# Patient Record
Sex: MALE | Race: WHITE | NOT HISPANIC OR LATINO | Employment: OTHER | URBAN - METROPOLITAN AREA
[De-identification: names, ages, dates, MRNs, and addresses within clinical notes are randomized per-mention and may not be internally consistent; named-entity substitution may affect disease eponyms.]

---

## 2017-01-17 ENCOUNTER — DOCUMENTATION ONLY (OUTPATIENT)
Dept: PSYCHIATRY | Facility: CLINIC | Age: 37
End: 2017-01-17

## 2017-01-17 ENCOUNTER — DOCUMENTATION ONLY (OUTPATIENT)
Dept: PSYCHIATRY | Facility: HOSPITAL | Age: 37
End: 2017-01-17

## 2017-02-24 ENCOUNTER — OFFICE VISIT (OUTPATIENT)
Dept: PSYCHIATRY | Facility: CLINIC | Age: 37
End: 2017-02-24
Payer: MEDICARE

## 2017-02-24 VITALS
DIASTOLIC BLOOD PRESSURE: 64 MMHG | HEIGHT: 65 IN | SYSTOLIC BLOOD PRESSURE: 110 MMHG | BODY MASS INDEX: 21.43 KG/M2 | WEIGHT: 128.63 LBS | HEART RATE: 64 BPM

## 2017-02-24 DIAGNOSIS — F40.10 SOCIAL ANXIETY DISORDER: ICD-10-CM

## 2017-02-24 DIAGNOSIS — R45.4 EXCESSIVE ANGER: ICD-10-CM

## 2017-02-24 DIAGNOSIS — F41.1 GAD (GENERALIZED ANXIETY DISORDER): Primary | ICD-10-CM

## 2017-02-24 PROCEDURE — 99212 OFFICE O/P EST SF 10 MIN: CPT | Mod: S$PBB,,, | Performed by: PSYCHIATRY & NEUROLOGY

## 2017-02-24 PROCEDURE — 99999 PR PBB SHADOW E&M-EST. PATIENT-LVL III: CPT | Mod: PBBFAC,,, | Performed by: PSYCHIATRY & NEUROLOGY

## 2017-02-24 PROCEDURE — 99213 OFFICE O/P EST LOW 20 MIN: CPT | Mod: PBBFAC | Performed by: PSYCHIATRY & NEUROLOGY

## 2017-02-24 RX ORDER — CLONAZEPAM 1 MG/1
1 TABLET ORAL 2 TIMES DAILY PRN
Qty: 60 TABLET | Refills: 2 | Status: SHIPPED | OUTPATIENT
Start: 2017-02-24 | End: 2017-05-08 | Stop reason: SDUPTHER

## 2017-02-24 NOTE — PROGRESS NOTES
"2/24/2017 11:35 AM   Dmitriy Barlow   1980   [id number]           OUTPATIENT PSYCHIATRY FOLLOW- UP VISIT    Reason for Encounter:  Dmitriy Barlow, a 36 y.o. male,who presents today for follow up of anxiety and behavior problems. . Met with patient.    Interval History and Content of Current Session:  Today,  Pt reports that he is doing well when he was on the klonopin. He states that he was not able to come in before he ran out and as a result he is anxious. He notes that he felt the previous dose was helping but states that someday he finds it wasn't fully working. He denied any new complains or any worsening of symptoms, including any anger outbursts, emotional episodes or panic attacks.  He also denied any SI/HI/AVH     Substance use  None reported    Review of Systems       Past Medical, Family and Social History: The patient's past medical, family and social history have been reviewed and updated as appropriate within the electronic medical record - see encounter notes.    Compliance: yes    Side effects: None    Risk Parameters:  Patient reports no suicidal ideation  Patient reports no homicidal ideation  Patient reports no self-injurious behavior  Patient reports no violent behavior    Objective     Constitutional  Vitals:  Most recent vital signs, dated less than 90 days prior to this appointment, were reviewed.    Vitals:    02/24/17 1125   BP: 110/64   Pulse: 64   Weight: 58.3 kg (128 lb 9.6 oz)   Height: 5' 5" (1.651 m)        General:  unremarkable, age appropriate       Laboratory Data: reviewed      Medications:  Outpatient Encounter Prescriptions as of 2/24/2017   Medication Sig Dispense Refill    clonazePAM (KLONOPIN) 0.5 MG tablet Take 1 tablet (0.5 mg total) by mouth 2 (two) times daily as needed for Anxiety. 42 tablet 0    hydrocodone-acetaminophen 5-325mg (NORCO) 5-325 mg per tablet Take 1 tablet by mouth every 4 (four) hours as needed. 12 tablet 0    hydrocortisone 2.5 % cream Apply " topically 4 (four) times daily. Use as needed until the rash is gone 20 g 1    ibuprofen (ADVIL,MOTRIN) 800 MG tablet Take 1 tablet (800 mg total) by mouth every 6 (six) hours as needed for Pain. 20 tablet 0     No facility-administered encounter medications on file as of 2/24/2017.        Allergy:  Review of patient's allergies indicates:   Allergen Reactions    Poison ivy [vit e-nonoxynol 9-aloe vera]     Poison oak extract     Poison sumac extract        Nutritional Screening: Considering the patient's height and weight, medications, medical history and preferences, should a referral be made to the dietitian? no      Musculoskeletal  Muscle Strength/Tone:  no spasicity, no rigidity   Gait & Station:  non-ataxic   AIMS*      Mental Status Exam:  Appearance: unremarkable, age appropriate  Behavior/Cooperation: appopriate friendly and cooperative  Speech: appropriate and  normal tone, normal rate, normal pitch, normal volume  Language: grossly intact, able to name with spontaneous speech  Mood: anxious  Affect:  congruent with mood and appropriate to situation/content Grossly intact  Thought Process: normal and logical  Thought Content: normal, no suicidality, no homicidality, delusions, or paranoia  Sensorium:  Awake  Alert and Oriented: x3 grossly intact  Memory: appropriate to context of interview   Recent: Intact; able to report recent events   Remote: Intact  Attention/concentration: appropriate for age/education.  Insight: Intact  Judgment: Intact    Assessment and Diagnosis   Status/Progress: Based on the examination today, the patient's problem(s) is/are adequately but not ideally controlled.  New problems have not been presented today.   Co-morbidities, Diagnostic uncertainty and Lack of compliance are not complicating management of the primary condition.  There are no active rule-out diagnoses for this patient at this time.     General Impression:       ICD-10-CM ICD-9-CM   1. DAYA (generalized anxiety  disorder) F41.1 300.02   2. Social anxiety disorder F40.10 300.23   3. Excessive anger R45.4 312.00       Intervention/Counseling/Treatment Plan   · Medication Management: -   · Increase Klonopin 1mg BID for anxiety and insomnia  · Informed pt of the risks of continuous Benzodiazepine use including tolerance, dependence and withdrawals that may be life threatening upon abrupt cessation. Also advised not to take Benzodiazepines with Opiates or other sedatives and also not to drive or operate heavy machinery while using Benzodiazepines.  · Labs: reviewed   · The treatment plan and follow up plan were reviewed with the patient.  · Discussed with patient informed consent, risks vs. benefits, alternative treatments, side effect profile and the inherent unpredictability of individual responses to these treatments. The patient expresses understanding of the above and displays the capacity to agree with this current plan and had no other questions.  · Encouraged Patient to keep future appointments.   · Take medications as prescribed and abstain from substance abuse.   · In the event of an emergency patient was advised to go to the emergency room.    Return to Clinic: 3 months    Coordination of care /Counseling time: > than 50% of total time was spend on this  Add on Psychotherapy time: 0  Total Face time:30m    ANA LUISA YANCEY MD   [facility]   2/24/2017 11:35 AM

## 2017-02-24 NOTE — MR AVS SNAPSHOT
[facility]  [address]  [place] [address]  Phone: [phone]  Fax: [phone]                  Dmitriy Barlow   2017 11:30 AM   Office Visit    Description:  Male : 1980   Provider:  Carmen Perry MD   Department:  [facility]           Diagnoses this Visit        Comments    DAYA (generalized anxiety disorder)    -  Primary     Social anxiety disorder         Excessive anger                To Do List           Goals (5 Years of Data)     None      Follow-Up and Disposition     Return in about 3 months (around 2017).       These Medications        Disp Refills Start End    clonazePAM (KLONOPIN) 1 MG tablet 60 tablet 2 2017 3/26/2017    Take 1 tablet (1 mg total) by mouth 2 (two) times daily as needed for Anxiety. - Oral    Pharmacy: [facility]/pharmacy #[id number] - [place] - [address]  #: [phone]         [facility]     [facility] On Call Nurse Care Line -  Assistance  Registered nurses in the [facility] On Call Center provide clinical advisement, health education, appointment booking, and other advisory services.  Call for this free service at [phone].             Medications           Message regarding Medications     Verify the changes and/or additions to your medication regime listed below are the same as discussed with your clinician today.  If any of these changes or additions are incorrect, please notify your healthcare provider.        CHANGE how you are taking these medications     Start Taking Instead of    clonazePAM (KLONOPIN) 1 MG tablet clonazePAM (KLONOPIN) 0.5 MG tablet    Dosage:  Take 1 tablet (1 mg total) by mouth 2 (two) times daily as needed for Anxiety. Dosage:  Take 1 tablet (0.5 mg total) by mouth 2 (two) times daily as needed for Anxiety.    Reason for Change:  Reorder       STOP taking these medications     hydrocodone-acetaminophen 5-325mg (NORCO) 5-325 mg per tablet Take 1 tablet by mouth every 4  (four) hours as needed.    ibuprofen (ADVIL,MOTRIN) 800 MG tablet Take 1 tablet (800 mg total) by mouth every 6 (six) hours as needed for Pain.    hydrocortisone 2.5 % cream Apply topically 4 (four) times daily. Use as needed until the rash is gone           Verify that the below list of medications is an accurate representation of the medications you are currently taking.  If none reported, the list may be blank. If incorrect, please contact your healthcare provider. Carry this list with you in case of emergency.           Current Medications     clonazePAM (KLONOPIN) 1 MG tablet Take 1 tablet (1 mg total) by mouth 2 (two) times daily as needed for Anxiety.           Clinical Reference Information           Your Vitals Were     BP                   110/64           Blood Pressure          Most Recent Value    BP  110/64      Allergies as of 2/24/2017     Poison Ivy [Vit E-nonoxynol 9-aloe Vera]    Poison Oak Extract    Poison Sumac Extract      Immunizations Administered on Date of Encounter - 2/24/2017     None      [facility] Sign-Up     Activating your [facility] account is as easy as 1-2-3!     1) Visit [email or web address], select Sign Up Now, enter this activation code and your date of birth, then select Next.  [email or web address]  Expires: 4/10/2017 11:51 AM      2) Create a username and password to use when you visit [facility] in the future and select a security question in case you lose your password and select Next.    3) Enter your e-mail address and click Sign Up!    Additional Information  If you have questions, please e-mail [email or web address] or call [phone] to talk to our [facility] staff. Remember, [facility] is NOT to be used for urgent needs. For medical emergencies, dial 911.         Smoking Cessation     If you would like to quit smoking:   You may be eligible for free services if you are a [place] resident and started smoking cigarettes before September 1, 1988.  Call the Smoking Cessation  Trust ([facility]) toll free at (835) 643-7936 or (122) 322-8821.   Call 1-800-QUIT-NOW if you do not meet the above criteria.            Language Assistance Services     ATTENTION: Language assistance services are available, free of charge. Please call [phone].      ATENCIÓN: Si habla español, tiene a lopez disposición servicios gratuitos de asistencia lingüística. Llame al [phone].     CHÚ Ý: N?u b?n nói Ti?ng Vi?t, có các d?ch v? h? tr? ngôn ng? mi?n phí dành cho b?n. G?i s? [phone].         [facility] complies with applicable Federal civil rights laws and does not discriminate on the basis of race, color, national origin, age, disability, or sex.

## 2017-02-26 ENCOUNTER — HOSPITAL ENCOUNTER (EMERGENCY)
Facility: HOSPITAL | Age: 37
Discharge: HOME | End: 2017-02-27
Attending: EMERGENCY MEDICINE
Payer: MEDICARE

## 2017-02-26 DIAGNOSIS — G89.11 ACUTE TRAUMATIC PAIN: ICD-10-CM

## 2017-02-26 DIAGNOSIS — S39.012A LUMBAR STRAIN, INITIAL ENCOUNTER: Primary | ICD-10-CM
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
## 2017-02-26 PROCEDURE — 99283 EMERGENCY DEPT VISIT LOW MDM: CPT

## 2017-02-26 PROCEDURE — 25000003 PHARM REV CODE 250: Performed by: EMERGENCY MEDICINE

## 2017-02-26 RX ORDER — ACETAMINOPHEN 500 MG
1000 TABLET ORAL
Status: COMPLETED | OUTPATIENT
Start: 2017-02-26 | End: 2017-02-26

## 2017-02-26 RX ORDER — KETOROLAC TROMETHAMINE 30 MG/ML
30 INJECTION, SOLUTION INTRAMUSCULAR; INTRAVENOUS
Status: DISCONTINUED | OUTPATIENT
Start: 2017-02-26 | End: 2017-02-26

## 2017-02-26 RX ADMIN — ACETAMINOPHEN 1000 MG: 500 TABLET ORAL at 11:02
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
## 2017-02-27 VITALS
TEMPERATURE: 99 F | OXYGEN SATURATION: 100 % | SYSTOLIC BLOOD PRESSURE: 106 MMHG | HEART RATE: 67 BPM | DIASTOLIC BLOOD PRESSURE: 68 MMHG | RESPIRATION RATE: 18 BRPM | HEIGHT: 65 IN | BODY MASS INDEX: 21.33 KG/M2 | WEIGHT: 128 LBS

## 2017-02-27 RX ORDER — MELOXICAM 7.5 MG/1
7.5 TABLET ORAL 2 TIMES DAILY PRN
Qty: 20 TABLET | Refills: 0 | Status: SHIPPED | OUTPATIENT
Start: 2017-02-27 | End: 2017-09-23
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
## 2017-02-27 NOTE — ED NOTES
Charge nurse, Sarah at bedside to talk with pt. She updated him on his wife that she was being discharged and would be sent to [facility] upon her discharge. Intake was done and blanket was taken to pt.
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
## 2017-03-28 ENCOUNTER — HOSPITAL ENCOUNTER (EMERGENCY)
Facility: HOSPITAL | Age: 37
Discharge: PSYCHIATRIC OR SUBSTANCE USE FACILITY | End: 2017-03-29
Attending: EMERGENCY MEDICINE
Payer: MEDICARE

## 2017-03-28 DIAGNOSIS — R45.851 SUICIDAL IDEATION: Primary | ICD-10-CM

## 2017-03-28 LAB
ALBUMIN SERPL BCP-MCNC: 4.9 G/DL
ALP SERPL-CCNC: 80 U/L
ALT SERPL W/O P-5'-P-CCNC: 12 U/L
AMPHET+METHAMPHET UR QL: NEGATIVE
ANION GAP SERPL CALC-SCNC: 26 MMOL/L
APAP SERPL-MCNC: <3 UG/ML
AST SERPL-CCNC: 21 U/L
BACTERIA #/AREA URNS HPF: NORMAL /HPF
BARBITURATES UR QL SCN>200 NG/ML: NEGATIVE
BASOPHILS # BLD AUTO: 0.04 K/UL
BASOPHILS NFR BLD: 0.3 %
BENZODIAZ UR QL SCN>200 NG/ML: NEGATIVE
BILIRUB SERPL-MCNC: 1 MG/DL
BILIRUB UR QL STRIP: NEGATIVE
BUN SERPL-MCNC: 17 MG/DL
BZE UR QL SCN: NORMAL
CALCIUM SERPL-MCNC: 10.3 MG/DL
CANNABINOIDS UR QL SCN: NORMAL
CHLORIDE SERPL-SCNC: 105 MMOL/L
CLARITY UR: CLEAR
CO2 SERPL-SCNC: 13 MMOL/L
COLOR UR: YELLOW
CREAT SERPL-MCNC: 1.2 MG/DL
CREAT UR-MCNC: 204.3 MG/DL
DIFFERENTIAL METHOD: ABNORMAL
EOSINOPHIL # BLD AUTO: 0.3 K/UL
EOSINOPHIL NFR BLD: 2.1 %
ERYTHROCYTE [DISTWIDTH] IN BLOOD BY AUTOMATED COUNT: 12.6 %
EST. GFR  (AFRICAN AMERICAN): >60 ML/MIN/1.73 M^2
EST. GFR  (NON AFRICAN AMERICAN): >60 ML/MIN/1.73 M^2
ETHANOL SERPL-MCNC: <10 MG/DL
GLUCOSE SERPL-MCNC: 122 MG/DL
GLUCOSE UR QL STRIP: NEGATIVE
HCT VFR BLD AUTO: 48 %
HGB BLD-MCNC: 16.4 G/DL
HGB UR QL STRIP: NEGATIVE
KETONES UR QL STRIP: NEGATIVE
LEUKOCYTE ESTERASE UR QL STRIP: NEGATIVE
LYMPHOCYTES # BLD AUTO: 3.4 K/UL
LYMPHOCYTES NFR BLD: 27.5 %
MCH RBC QN AUTO: 31.1 PG
MCHC RBC AUTO-ENTMCNC: 34.2 %
MCV RBC AUTO: 91 FL
METHADONE UR QL SCN>300 NG/ML: NEGATIVE
MICROSCOPIC COMMENT: NORMAL
MONOCYTES # BLD AUTO: 1.3 K/UL
MONOCYTES NFR BLD: 10.4 %
NEUTROPHILS # BLD AUTO: 7.3 K/UL
NEUTROPHILS NFR BLD: 60.1 %
NITRITE UR QL STRIP: NEGATIVE
OPIATES UR QL SCN: NEGATIVE
PCP UR QL SCN>25 NG/ML: NEGATIVE
PH UR STRIP: 6 [PH] (ref 5–8)
PLATELET # BLD AUTO: 295 K/UL
PMV BLD AUTO: 9.3 FL
POTASSIUM SERPL-SCNC: 3.5 MMOL/L
PROT SERPL-MCNC: 8.2 G/DL
PROT UR QL STRIP: NEGATIVE
RBC # BLD AUTO: 5.28 M/UL
RBC #/AREA URNS HPF: 0 /HPF (ref 0–4)
SALICYLATES SERPL-MCNC: <5 MG/DL
SODIUM SERPL-SCNC: 144 MMOL/L
SP GR UR STRIP: 1.02 (ref 1–1.03)
TOXICOLOGY INFORMATION: NORMAL
TSH SERPL DL<=0.005 MIU/L-ACNC: 1.23 UIU/ML
URN SPEC COLLECT METH UR: NORMAL
UROBILINOGEN UR STRIP-ACNC: NEGATIVE EU/DL
WBC # BLD AUTO: 12.24 K/UL
WBC #/AREA URNS HPF: 3 /HPF (ref 0–5)

## 2017-03-28 PROCEDURE — 63600175 PHARM REV CODE 636 W HCPCS: Performed by: EMERGENCY MEDICINE

## 2017-03-28 PROCEDURE — 99285 EMERGENCY DEPT VISIT HI MDM: CPT | Mod: 25

## 2017-03-28 PROCEDURE — 80307 DRUG TEST PRSMV CHEM ANLYZR: CPT

## 2017-03-28 PROCEDURE — 84443 ASSAY THYROID STIM HORMONE: CPT

## 2017-03-28 PROCEDURE — 80053 COMPREHEN METABOLIC PANEL: CPT

## 2017-03-28 PROCEDURE — 81000 URINALYSIS NONAUTO W/SCOPE: CPT

## 2017-03-28 PROCEDURE — 80329 ANALGESICS NON-OPIOID 1 OR 2: CPT

## 2017-03-28 PROCEDURE — 80320 DRUG SCREEN QUANTALCOHOLS: CPT

## 2017-03-28 PROCEDURE — 85025 COMPLETE CBC W/AUTO DIFF WBC: CPT

## 2017-03-28 PROCEDURE — 96372 THER/PROPH/DIAG INJ SC/IM: CPT

## 2017-03-28 PROCEDURE — 82570 ASSAY OF URINE CREATININE: CPT

## 2017-03-28 RX ORDER — LORAZEPAM 2 MG/ML
2 INJECTION INTRAMUSCULAR
Status: COMPLETED | OUTPATIENT
Start: 2017-03-28 | End: 2017-03-28

## 2017-03-28 RX ORDER — DIPHENHYDRAMINE HYDROCHLORIDE 50 MG/ML
50 INJECTION INTRAMUSCULAR; INTRAVENOUS
Status: COMPLETED | OUTPATIENT
Start: 2017-03-28 | End: 2017-03-28

## 2017-03-28 RX ORDER — HALOPERIDOL 5 MG/ML
5 INJECTION INTRAMUSCULAR
Status: COMPLETED | OUTPATIENT
Start: 2017-03-28 | End: 2017-03-28

## 2017-03-28 RX ADMIN — HALOPERIDOL LACTATE 5 MG: 5 INJECTION, SOLUTION INTRAMUSCULAR at 06:03

## 2017-03-28 RX ADMIN — DIPHENHYDRAMINE HYDROCHLORIDE 50 MG: 50 INJECTION, SOLUTION INTRAMUSCULAR; INTRAVENOUS at 07:03

## 2017-03-28 RX ADMIN — LORAZEPAM 2 MG: 2 INJECTION, SOLUTION INTRAMUSCULAR; INTRAVENOUS at 06:03

## 2017-03-28 RX ADMIN — HALOPERIDOL LACTATE 5 MG: 5 INJECTION, SOLUTION INTRAMUSCULAR at 07:03

## 2017-03-28 RX ADMIN — LORAZEPAM 2 MG: 2 INJECTION, SOLUTION INTRAMUSCULAR; INTRAVENOUS at 07:03

## 2017-03-28 RX ADMIN — DIPHENHYDRAMINE HYDROCHLORIDE 50 MG: 50 INJECTION, SOLUTION INTRAMUSCULAR; INTRAVENOUS at 06:03

## 2017-03-29 VITALS
BODY MASS INDEX: 23.7 KG/M2 | OXYGEN SATURATION: 95 % | RESPIRATION RATE: 14 BRPM | HEART RATE: 61 BPM | TEMPERATURE: 98 F | WEIGHT: 160 LBS | SYSTOLIC BLOOD PRESSURE: 94 MMHG | DIASTOLIC BLOOD PRESSURE: 53 MMHG | HEIGHT: 69 IN

## 2017-03-29 PROCEDURE — 93005 ELECTROCARDIOGRAM TRACING: CPT
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
## 2017-03-29 NOTE — ED NOTES
"All restraints off at present,patient is cooperative,following commands. Patient stated,"I did not want to kill myself or anyone else." I was mad and upset and said things I shouldn't said.  "

## 2017-03-29 NOTE — ED PROVIDER NOTES
"Encounter Date: 3/28/2017    SCRIBE #1 NOTE: I, Nabor Daniels, am scribing for, and in the presence of,  Cristy Diane MD. I have scribed the following portions of the note - Other sections scribed: HPI/ROS/PE.       History     Chief Complaint   Patient presents with    Suicidal     arrived via [facility], called to home for c/o suicidal thoughts, EMS reports pt called the suicide hotline and threatened to hang himself, family reported seeing pt with rope, arrived in custody with [facility] officer, due to uncooperative, threatening staff, cursing, reports will not speak to anyone of color      Review of patient's allergies indicates:   Allergen Reactions    Poison ivy [vit e-nonoxynol 9-aloe vera]     Poison oak extract     Poison sumac extract      HPI Comments: CC: Suicidal    HPI: This 36 y.o. Male with bipolar disorder presents to the ED via [employer] [facility] and EMS with suicidal ideation. Per [employer] [facility] (Officer PURA Capone), pt called the suicide hotline and threatened to hang himself after getting in an argument with his wife about their both being bisexual. The family reported seeing pt with a rope. The pt barricaded himself in a greenhouse on his mother's property before being coaxed out by a [employer] officer. The pt arrived with [employer] due to uncooperative, aggressive behavior. The pt was threatening staff, cursing, and refusing to speak to anyone of color. The officer states that the pt is believed to be a KKK member. The pt denies SI or HI. The pt also stated that he "has been here multiple times and knows how to work the system."    PMH: chronic back pain, anxiety, bipolar disorder, and a hx of psychiatric hospitalization     The history is provided by the patient. No  was used.     Past Medical History:   Diagnosis Date    Anxiety     Bipolar disorder     Chronic back pain     upper and lower    History of psychiatric hospitalization     Knee pain, bilateral  "    from old injuries     History reviewed. No pertinent surgical history.  Family History   Problem Relation Age of Onset    Birth defects Neg Hx     Cancer Neg Hx     Hearing loss Neg Hx     Heart disease Neg Hx     Hypertension Neg Hx      Social History   Substance Use Topics    Smoking status: Current Every Day Smoker     Packs/day: 0.50     Years: 12.00     Types: Cigarettes    Smokeless tobacco: Current User    Alcohol use Yes     Review of Systems   Unable to perform ROS: Patient unresponsive   Respiratory: Negative for shortness of breath.    Cardiovascular: Negative for chest pain.   Gastrointestinal: Negative for abdominal pain.   Musculoskeletal: Negative for back pain.   Neurological: Negative for weakness.   Hematological: Does not bruise/bleed easily.   Psychiatric/Behavioral: Positive for behavioral problems and suicidal ideas.       Physical Exam   Initial Vitals   BP Pulse Resp Temp SpO2   03/28/17 1849 03/28/17 2028 03/28/17 1849 03/29/17 0030 03/28/17 1849   123/84 85 20 97.9 °F (36.6 °C) 98 %     Physical Exam    Nursing note and vitals reviewed.  Constitutional: He appears well-developed and well-nourished. No distress.   Agitated, belligerent adult male.   HENT:   Head: Normocephalic and atraumatic.   Mouth/Throat: Oropharynx is clear and moist.   Eyes: Conjunctivae and EOM are normal. Pupils are equal, round, and reactive to light.   Neck: Normal range of motion. Neck supple.   Cardiovascular: Normal rate and normal heart sounds.   Pulmonary/Chest: Effort normal and breath sounds normal. No respiratory distress.   Abdominal: Soft. Normal appearance and bowel sounds are normal. There is no tenderness.   Musculoskeletal: Normal range of motion.   Neurological: He is alert and oriented to person, place, and time. He has normal strength.   Skin: Skin is warm and dry.   Psychiatric: His affect is angry and inappropriate. He is agitated, aggressive and combative. He expresses impulsivity and  inappropriate judgment.         ED Course   Procedures  Labs Reviewed   CBC W/ AUTO DIFFERENTIAL - Abnormal; Notable for the following:        Result Value    MCH 31.1 (*)     Mono # 1.3 (*)     All other components within normal limits   COMPREHENSIVE METABOLIC PANEL - Abnormal; Notable for the following:     CO2 13 (*)     Glucose 122 (*)     Anion Gap 26 (*)     All other components within normal limits   ACETAMINOPHEN LEVEL - Abnormal; Notable for the following:     Acetaminophen (Tylenol), Serum <3.0 (*)     All other components within normal limits   SALICYLATE LEVEL - Abnormal; Notable for the following:     Salicylate Lvl <5.0 (*)     All other components within normal limits   TSH   URINALYSIS   DRUG SCREEN PANEL, URINE EMERGENCY   ALCOHOL,MEDICAL (ETHANOL)   URINALYSIS MICROSCOPIC     EKG Readings: (Independently Interpreted)   01:51: NSR, HR 75. Normal axis and intervals. TWI in III, aVF. Q wave in aVL. No STEMI.          Medical Decision Making:   History:   I obtained history from: EMS provider.  Old Medical Records: I decided to obtain old medical records.  Old Records Summarized: records from previous admission(s) and records from clinic visits.  Initial Assessment:   This is an emergent evaluation of a 36-year-old male brought in by [place] [employer] and EMS for suicidal ideation and belligerent behavior.  Differential Diagnosis:   Differential includes suicidal ideation, homicidal ideation, psychosis, toxidrome, malingering, other.  Independently Interpreted Test(s):   I have ordered and independently interpreted EKG Reading(s) - see prior notes  Clinical Tests:   Lab Tests: Ordered and Reviewed  The following lab test(s) were unremarkable: CBC  Medical Tests: Ordered and Reviewed  ED Management:  Patient required 2 rounds of B-52 for sedation.  Subsequently became more cooperative.  Denied suicidal ideation in the emergency department.  However, because of his earlier behavior, as well as his reported  behavior with EMS and police, I am concerned that he may actually represent a danger to himself or others.  His ED labs are reassuring.  He does have THC and cocaine on his tox screen.  His alcohol level is less than 10.  At this time, he is medically clear for psychiatric transfer.  I have placed him under PEC for psychiatric evaluation.            Scribe Attestation:   Scribe #1: I performed the above scribed service and the documentation accurately describes the services I performed. I attest to the accuracy of the note.    Attending Attestation:           Physician Attestation for Scribe:  Physician Attestation Statement for Scribe #1: I, Cristy Diane MD, reviewed documentation, as scribed by Nabor Daniels in my presence, and it is both accurate and complete.                 ED Course     Clinical Impression:   The encounter diagnosis was Suicidal ideation.          Cristy Diane MD  03/29/17 0346

## 2017-03-29 NOTE — ED NOTES
Patient remains quiet and calm restraints check all pulses palpable good capillary refill less than 3 seconds.Restraints secured to bed frame.

## 2017-03-29 NOTE — ED NOTES
"Pt given medication while restrained by security and house supervisor. Pt stated "I'm going to find you and hurt you. I'll be watching your shifts."  "

## 2017-06-11 ENCOUNTER — HOSPITAL ENCOUNTER (EMERGENCY)
Facility: HOSPITAL | Age: 37
Discharge: PSYCHIATRIC OR SUBSTANCE USE FACILITY | End: 2017-06-12
Attending: EMERGENCY MEDICINE
Payer: MEDICARE

## 2017-06-11 DIAGNOSIS — R45.851 SUICIDAL IDEATION: Primary | ICD-10-CM

## 2017-06-11 DIAGNOSIS — R45.4 ANGER REACTION: ICD-10-CM

## 2017-06-11 DIAGNOSIS — Z00.8 EVALUATION BY PSYCHIATRIC SERVICE REQUIRED: ICD-10-CM

## 2017-06-11 LAB
ALBUMIN SERPL BCP-MCNC: 4.4 G/DL
ALP SERPL-CCNC: 69 U/L
ALT SERPL W/O P-5'-P-CCNC: 10 U/L
ANION GAP SERPL CALC-SCNC: 11 MMOL/L
APAP SERPL-MCNC: <3 UG/ML
AST SERPL-CCNC: 23 U/L
BASOPHILS # BLD AUTO: 0.02 K/UL
BASOPHILS NFR BLD: 0.2 %
BILIRUB SERPL-MCNC: 0.8 MG/DL
BUN SERPL-MCNC: 19 MG/DL
CALCIUM SERPL-MCNC: 9.7 MG/DL
CHLORIDE SERPL-SCNC: 104 MMOL/L
CO2 SERPL-SCNC: 24 MMOL/L
CREAT SERPL-MCNC: 1.2 MG/DL
DIFFERENTIAL METHOD: ABNORMAL
EOSINOPHIL # BLD AUTO: 0 K/UL
EOSINOPHIL NFR BLD: 0.2 %
ERYTHROCYTE [DISTWIDTH] IN BLOOD BY AUTOMATED COUNT: 12.5 %
EST. GFR  (AFRICAN AMERICAN): >60 ML/MIN/1.73 M^2
EST. GFR  (NON AFRICAN AMERICAN): >60 ML/MIN/1.73 M^2
ETHANOL SERPL-MCNC: <10 MG/DL
GLUCOSE SERPL-MCNC: 106 MG/DL
HCT VFR BLD AUTO: 43.9 %
HGB BLD-MCNC: 15.5 G/DL
HIV1+2 IGG SERPL QL IA.RAPID: NEGATIVE
LYMPHOCYTES # BLD AUTO: 1.1 K/UL
LYMPHOCYTES NFR BLD: 9.6 %
MCH RBC QN AUTO: 30.6 PG
MCHC RBC AUTO-ENTMCNC: 35.3 %
MCV RBC AUTO: 87 FL
MONOCYTES # BLD AUTO: 0.9 K/UL
MONOCYTES NFR BLD: 8 %
NEUTROPHILS # BLD AUTO: 9.6 K/UL
NEUTROPHILS NFR BLD: 81.9 %
PLATELET # BLD AUTO: 302 K/UL
PMV BLD AUTO: 9.2 FL
POTASSIUM SERPL-SCNC: 3.5 MMOL/L
PROT SERPL-MCNC: 7.5 G/DL
RBC # BLD AUTO: 5.06 M/UL
SODIUM SERPL-SCNC: 139 MMOL/L
WBC # BLD AUTO: 11.76 K/UL

## 2017-06-11 PROCEDURE — 80329 ANALGESICS NON-OPIOID 1 OR 2: CPT

## 2017-06-11 PROCEDURE — 84443 ASSAY THYROID STIM HORMONE: CPT

## 2017-06-11 PROCEDURE — 86703 HIV-1/HIV-2 1 RESULT ANTBDY: CPT

## 2017-06-11 PROCEDURE — 93005 ELECTROCARDIOGRAM TRACING: CPT

## 2017-06-11 PROCEDURE — 96372 THER/PROPH/DIAG INJ SC/IM: CPT

## 2017-06-11 PROCEDURE — 63600175 PHARM REV CODE 636 W HCPCS: Performed by: EMERGENCY MEDICINE

## 2017-06-11 PROCEDURE — 99285 EMERGENCY DEPT VISIT HI MDM: CPT | Mod: 25

## 2017-06-11 PROCEDURE — 85025 COMPLETE CBC W/AUTO DIFF WBC: CPT

## 2017-06-11 PROCEDURE — 80053 COMPREHEN METABOLIC PANEL: CPT

## 2017-06-11 PROCEDURE — 80320 DRUG SCREEN QUANTALCOHOLS: CPT

## 2017-06-11 RX ORDER — LORAZEPAM 2 MG/ML
1 INJECTION INTRAMUSCULAR
Status: COMPLETED | OUTPATIENT
Start: 2017-06-11 | End: 2017-06-11

## 2017-06-11 RX ORDER — DIPHENHYDRAMINE HYDROCHLORIDE 50 MG/ML
50 INJECTION INTRAMUSCULAR; INTRAVENOUS
Status: COMPLETED | OUTPATIENT
Start: 2017-06-11 | End: 2017-06-11

## 2017-06-11 RX ORDER — HALOPERIDOL 5 MG/ML
5 INJECTION INTRAMUSCULAR
Status: COMPLETED | OUTPATIENT
Start: 2017-06-11 | End: 2017-06-11

## 2017-06-11 RX ORDER — LORAZEPAM 2 MG/ML
2 INJECTION INTRAMUSCULAR
Status: COMPLETED | OUTPATIENT
Start: 2017-06-11 | End: 2017-06-11

## 2017-06-11 RX ADMIN — LORAZEPAM 1 MG: 2 INJECTION INTRAMUSCULAR; INTRAVENOUS at 09:06

## 2017-06-11 RX ADMIN — LORAZEPAM 2 MG: 2 INJECTION INTRAMUSCULAR; INTRAVENOUS at 09:06

## 2017-06-11 RX ADMIN — HALOPERIDOL LACTATE 5 MG: 5 INJECTION, SOLUTION INTRAMUSCULAR at 09:06

## 2017-06-11 RX ADMIN — DIPHENHYDRAMINE HYDROCHLORIDE 50 MG: 50 INJECTION, SOLUTION INTRAMUSCULAR; INTRAVENOUS at 09:06

## 2017-06-12 VITALS
HEART RATE: 66 BPM | RESPIRATION RATE: 16 BRPM | SYSTOLIC BLOOD PRESSURE: 103 MMHG | DIASTOLIC BLOOD PRESSURE: 64 MMHG | TEMPERATURE: 98 F | OXYGEN SATURATION: 98 %

## 2017-06-12 PROBLEM — F19.94 SUBSTANCE INDUCED MOOD DISORDER: Status: ACTIVE | Noted: 2017-06-12

## 2017-06-12 PROBLEM — F12.20 CANNABIS USE DISORDER, SEVERE, DEPENDENCE: Status: ACTIVE | Noted: 2017-06-12

## 2017-06-12 LAB
AMPHET+METHAMPHET UR QL: NEGATIVE
BACTERIA #/AREA URNS HPF: NORMAL /HPF
BARBITURATES UR QL SCN>200 NG/ML: NEGATIVE
BENZODIAZ UR QL SCN>200 NG/ML: ABNORMAL
BILIRUB UR QL STRIP: NEGATIVE
BZE UR QL SCN: NEGATIVE
CANNABINOIDS UR QL SCN: ABNORMAL
CLARITY UR: ABNORMAL
COLOR UR: ABNORMAL
CREAT UR-MCNC: 431.1 MG/DL
EPITH CASTS #/AREA URNS LPF: NORMAL /LPF
GLUCOSE UR QL STRIP: NEGATIVE
HGB UR QL STRIP: NEGATIVE
HYALINE CASTS #/AREA URNS LPF: 0 /LPF
KETONES UR QL STRIP: ABNORMAL
LEUKOCYTE ESTERASE UR QL STRIP: NEGATIVE
METHADONE UR QL SCN>300 NG/ML: NEGATIVE
MICROSCOPIC COMMENT: NORMAL
NITRITE UR QL STRIP: NEGATIVE
OPIATES UR QL SCN: NEGATIVE
PCP UR QL SCN>25 NG/ML: NEGATIVE
PH UR STRIP: 6 [PH] (ref 5–8)
PROT UR QL STRIP: ABNORMAL
RBC #/AREA URNS HPF: 0 /HPF (ref 0–4)
SP GR UR STRIP: 1.03 (ref 1–1.03)
TOXICOLOGY INFORMATION: ABNORMAL
TSH SERPL DL<=0.005 MIU/L-ACNC: 1.26 UIU/ML
URN SPEC COLLECT METH UR: ABNORMAL
UROBILINOGEN UR STRIP-ACNC: ABNORMAL EU/DL
WBC #/AREA URNS HPF: 2 /HPF (ref 0–5)

## 2017-06-12 PROCEDURE — 80307 DRUG TEST PRSMV CHEM ANLYZR: CPT

## 2017-06-12 PROCEDURE — 90792 PSYCH DIAG EVAL W/MED SRVCS: CPT | Mod: ,,, | Performed by: PSYCHIATRY & NEUROLOGY

## 2017-06-12 PROCEDURE — 81000 URINALYSIS NONAUTO W/SCOPE: CPT

## 2017-06-12 NOTE — ED NOTES
Patient allowed myself to draw his blood to be sent off to the lab.  and one other nurse at bedside. Patient still in 4 point restraints.

## 2017-06-12 NOTE — ED TRIAGE NOTES
"Patient arrives via [facility] [employer]. Patient is acting wild. Patient is walking down the cano to the room when he kicked the wet floor sign over. When asking the patient what brought him in today he states, "fuck you." Patient states that he wants him mom to be called. Patient states that he was trying to go to him moms house to get his clothes so that he could go voluntary admit himself to [place]. Patient states, "I was going to stand outside that mother fucker until they let me come in." Security has been called.  states that he was called because the patient had sent text messages to his mother today stating that he was going to commit suicide. Patient's text messages to his mother read, "I am going to slit my throat." Patient is extremely agitated and hostile right now. Patient is making racial slurs. Patient is physically and verbally threatening staff, security, and police officers. Patient states that, "I am going to blow up the police station. I am going to kill every mother fucker in the place. I am going to hang all n*ggers." Patient admits to SI and HI. Patient refuses to have vital signs taken or any care.   "
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
## 2017-06-12 NOTE — ED NOTES
PATIENT HAVE BEEN TRANSPORTED TO BY S  TRANSPORTATION SERVICES [facility] AT 2:17PM 06/12/2017.

## 2017-06-12 NOTE — CONSULTS
"[facility]  Psychiatry  Consult Note    Patient Name: Dmitriy Barlow  MRN: [id number]   Code Status: No Order  Admission Date: 6/11/2017  Hospital Length of Stay: 0 days  Attending Physician: No att. providers found  Primary Care Provider: Primary Doctor No    Current Legal Status: PEC    Patient information was obtained from patient and ER records.   Consults  Subjective:     Principal Problem:<principal problem not specified>    Chief Complaint:  Altered mental status     HPI: Patient presents to hospital via [facility]. Patient is in hospital bed, easily aroused to voice, and dressed in hospital gown. Patient is PEC'd for suicidal ideation and belligerent behavior. Patient reports that he does not remember why he is in the hospital. Patient reports that he has been hospitalized multiple times at a variety of hospitals "and another [facility] ain't going to do not [facility] good". On review of medical record it indicates that patient was acting bizarre and banging on his mother's door and saying that he was going to "slit my throat". While in ED patient was agitated as well as verbally and physically aggressive to staff. Patient is calm and cooperative on interview. Patient is guarded during interview and gives brief answers to questions with an irritable tone. Patient reports that he has depression but refuses to take medication. Reports that he does see a psychiatrist but doesn't remember her name or where.  Records indicate that he sees Carmen Perry MD at [facility] dept of psychiatry.  Reports that the only medication that he will take in Klonopin. He will not disclose further.  Toxicology positive for THC.  Currently not in restraints but is sedated from IM Haldol/Ativan/Benadryl.    Hospital Course: Medically cleared by the ED.         Patient History           Medical as of 6/12/2017     Past Medical History Date Comments Source    Anxiety -- -- Provider "    Bipolar disorder -- -- Provider    Borderline personality disorder -- -- Provider    Chronic back pain -- upper and lower Provider    History of psychiatric hospitalization -- -- Provider    Hx of psychiatric care -- currently only taking Klonopin Provider    Knee pain, bilateral -- from old injuries Provider    Psychiatric problem -- -- Provider    Social anxiety disorder -- -- Provider    Therapy -- doesn't remember her name Provider    Pertinent Negatives Date Noted Comments Source    Diabetes mellitus 1/9/2014 -- Provider            Surgical as of 6/12/2017    Past Surgical History: Patient provided no pertinent surgical history.           Family as of 6/12/2017     Problem Relation Name Age of Onset Comments Source    Birth defects Neg Hx -- -- -- Provider    Cancer Neg Hx -- -- -- Provider    Hearing loss Neg Hx -- -- -- Provider    Heart disease Neg Hx -- -- -- Provider    Hypertension Neg Hx -- -- -- Provider            Tobacco Use as of 6/12/2017     Smoking Status Smoking Start Date Smoking Quit Date Packs/day Years Used    Current Every Day Smoker -- -- 0.50 12.00    Types Comments Smokeless Tobacco Status Smokeless Tobacco Quit Date Source     Cigarettes -- Current User -- Provider            Alcohol Use as of 6/12/2017     Alcohol Use Drinks/Week Alcohol/Week Comments Source    Yes -- -- -- Provider            Drug Use as of 6/12/2017     Drug Use Types Frequency Comments Source    Yes  Marijuana -- -- Provider            Sexual Activity as of 6/12/2017     Sexually Active Birth Control Partners Comments Source    Yes -- Female -- Provider            Activities of Daily Living as of 6/12/2017     Activities of Daily Living Question Response Comments Source    Patient feels they ought to cut down on drinking/drug use Not Asked -- Provider    Patient annoyed by others criticizing their drinking/drug use Not Asked -- Provider    Patient has felt bad or guilty about drinking/drug use Not Asked --  "Provider    Patient has had a drink/used drugs as an eye opener in the AM Not Asked -- Provider            Social Documentation as of 6/12/2017    Mr. Barlow currently lives in [place], [place] with girlfriend and his son.    Education: did not graduated high school, doesn't remember last grade level he completed  Arrests: yes, did not expand  Leisure: "nothing"  Social (Marriage, Living Situation, Children): , has three children.   Financial: "who doesn't"    Psychiatric:  History of Illness: chart indicates that patient has a history of depression, bipolar, and personality traits  Treatment History:        -Psychiatrist: yes, doesn't remember her name       -Psychiatric Medications: klonopin, "refuse to take anything else"  Alcohol Use: none  Drug Use: marijuana, did not expand on how much he is using  SI/HI/AVH: denies    Source: Provider           Occupational as of 6/12/2017     Occupation Employer Comments Source    [employer] -- -- Provider            Socioeconomic as of 6/12/2017     Marital Status Spouse Name Number of Children Years Education Preferred Language Ethnicity Race Source     -- 3 College English /White White Provider         Additional Pertinent History Q A Comments    as of 6/12/2017 Place in Birth Order      Number of Siblings      Raised by adoptive parents     Childhood Trauma early trauma     Financial Status employed     Highest Level of Education unfinished highschool     Lives with family     Lives in home     Criminal History of arrest and incarceration     Legal Involvement      Does patient have access to a firearm? No      Service No     Primary Leisure Activity time with family     Spirituality non-spiritual     Caffeine Use         Review of patient's allergies indicates:   Allergen Reactions    Poison ivy [vit e-nonoxynol 9-aloe vera]     Poison oak extract     Poison sumac extract        No current facility-administered medications on file prior to " encounter.      Current Outpatient Prescriptions on File Prior to Encounter   Medication Sig    clonazePAM (KLONOPIN) 1 MG tablet Take 1 tablet (1 mg total) by mouth 2 (two) times daily as needed for Anxiety.    meloxicam (MOBIC) 7.5 MG tablet Take 1 tablet (7.5 mg total) by mouth 2 (two) times daily as needed for Pain.     Psychotherapeutics     None        Review of Systems   Unable to perform ROS: Psychiatric disorder (will not answer)     Objective:     Vital Signs (Most Recent):  Temp: 98.3 °F (36.8 °C) (06/12/17 1135)  Pulse: 66 (06/12/17 1135)  Resp: 16 (06/12/17 1135)  BP: 103/64 (06/12/17 1135)  SpO2: 98 % (06/12/17 1135) Vital Signs (24h Range):  Temp:  [98.3 °F (36.8 °C)] 98.3 °F (36.8 °C)  Pulse:  [66] 66  Resp:  [16] 16  SpO2:  [98 %] 98 %  BP: (103)/(64) 103/64           There is no height or weight on file to calculate BMI.    No intake or output data in the 24 hours ending 06/12/17 1420    Physical Exam   Psychiatric:   EXAMINATION    CONSTITUTIONAL  General Appearance: hospital attire    MUSCULOSKELETAL  Muscle Strength and Tone: normal  Abnormal Involuntary Movements: none noted  Gait and Station: not observed    PSYCHIATRIC MENTAL STATUS EXAM   Level of Consciousness: sleepy but can be awoken  Orientation: will not answer  Grooming: limited  Psychomotor Behavior: slowed  Speech: soft and somewhat slurred  Language: not able to obtain  Mood: will not answer  Affect: irritable  Thought Process: tangential  Associations: loose  Thought Content: voiced suicidal intent prior to being brought in but will not participate in conversation now  Memory: will not answer  Attention: distracted  Fund of Knowledge: limited  Insight: limited  Judgment: limited/poor              Significant Labs:   Last 24 Hours:   Recent Lab Results       06/12/17  0759 06/12/17  0758 06/11/17  3164      Benzodiazepines  Presumptive Positive      Methadone metabolites  Negative      Phencyclidine  Negative      Acetaminophen  (Tylenol), Serum   <3.0  Comment:  Toxic Levels:  Adults (4 hr post-ingestion).........>150 ug/mL  Adults (12 hr post-ingestion)........>40 ug/mL  Peds (2 hr post-ingestion, liquid)...>225 ug/mL  (L)     Albumin   4.4     Alcohol, Medical, Serum   <10     Alkaline Phosphatase   69     ALT   10     Amphetamine Screen, Ur  Negative      Anion Gap   11     Appearance, UA Hazy(A)       AST   23     Bacteria, UA None       Barbiturate Screen, Ur  Negative      Baso #   0.02     Basophil%   0.2     Bilirubin (UA) Negative       Total Bilirubin   0.8  Comment:  For infants and newborns, interpretation of results should be based  on gestational age, weight and in agreement with clinical  observations.  Premature Infant recommended reference ranges:  Up to 24 hours.............<8.0 mg/dL  Up to 48 hours............<12.0 mg/dL  3-5 days..................<15.0 mg/dL  6-29 days.................<15.0 mg/dL       BUN, Bld   19     Calcium   9.7     Chloride   104     CO2   24     Cocaine (Metab.)  Negative      Color, UA Kenzie       Creatinine   1.2     Creatinine, Random Ur  431.1  Comment:  The random urine reference ranges provided were established   for 24 hour urine collections.  No reference ranges exist for  random urine specimens.  Correlate clinically.  (H)      Differential Method   Automated     eGFR if    >60     eGFR if non    >60  Comment:  Calculation used to obtain the estimated glomerular filtration  rate (eGFR) is the CKD-EPI equation. Since race is unknown   in our information system, the eGFR values for   -American and Non--American patients are given   for each creatinine result.       Eos #   0.0     Eosinophil%   0.2     Epithelial Casts, UA CANCELED  Comment:  Result canceled by the ancillary       Glucose   106     Glucose, UA Negative       Gran #   9.6(H)     Gran%   81.9(H)     Hematocrit   43.9     Hemoglobin   15.5     HIV Rapid Testing   Negative      Hyaline Casts, UA 0       Ketones, UA 1+(A)       Leukocytes, UA Negative       Lymph #   1.1     Lymph%   9.6(L)     MCH   30.6     MCHC   35.3     MCV   87     Microscopic Comment SEE COMMENT  Comment:  Other formed elements not mentioned in the report are not   present in the microscopic examination.          Mono #   0.9     Mono%   8.0     MPV   9.2     Nitrite, UA Negative       Occult Blood UA Negative       Opiate Scrn, Ur  Negative      pH, UA 6.0       Platelets   302     Potassium   3.5     Total Protein   7.5     Protein, UA 1+  Comment:  Recommend a 24 hour urine protein or a urine   protein/creatinine ratio if globulin induced proteinuria is  clinically suspected.  (A)       RBC   5.06     RBC, UA 0       RDW   12.5     Sodium   139     Specific Gravity, UA 1.030       Specimen UA Urine, Clean Catch       Marijuana (THC) Metabolite  Presumptive Positive      Toxicology Information  SEE COMMENT  Comment:  This screen includes the following classes of drugs at the   listed cut-off:  Benzodiazepines                  200 ng/ml  Methadone                        300 ng/ml  Cocaine metabolite               300 ng/ml  Opiates                          300 ng/ml  Barbiturates                     200 ng/ml  Amphetamines                    1000 ng/ml  Marijuana metabs (THC)            50 ng/ml  Phencyclidine (PCP)               25 ng/ml  High concentrations of Diphenhydramine may cross-react with  Phencyclidine PCP screening immunoassay giving a false   positive result.  High concentrations of Methylenedioxymethamphetamine (MDMA aka  Ectasy) and other structurally similar compounds may cross-   react with the Amphetamine/Methamphetamine screening   immunoassay giving a false positive result.  A metabolite of the anti-HIV drug Sustiva () may cause  false positive results in the Marijuana metabolite (THC)   screening assay.  Note: This exception list includes only more common   interferants in toxicology  screen testing.  Because of many   cross-reactantspositive results on toxicology drug screens   should be confirmed whenever results do not correlate with   clinical presentation.  This report is intended for use in clinical monitoring and  management of patients. It is not intended for use in   employment related drug testing.  Because of any cross-reactants, positive results on toxicology  drug screens should be confirmed whenever results do not  correlate with clinical presentation.  Presumptive positive results are unconfirmed and may be used   only for medical purposes.        TSH   1.260     Urobilinogen, UA 2.0-3.0(A)       WBC, UA 2       WBC   11.76           Significant Imaging: I have reviewed all pertinent imaging results/findings within the past 24 hours.    Assessment/Plan:     Substance induced mood disorder    Given the cannabis and possible synthetic use, SIMD cannot be ruled out.  Continue to educate on cessation.  Will likely be self limiting.  Utilize Haldol 10mg, Ativan 2mg, Benadryl 50mg PO/IM every 6 hours PRN acute psychotic agitation or non-redirectable behaviors.        Cannabis use disorder, severe, dependence    Patient has chronic cannabis use disorder.  Educated on use and cessation but patient does not want to listen.  Says he is not interested in rehab.  Concern for MOJO or synthetic marijuana use as this has been popular in the area in which he lives.  This may also account for his behavioral changes.  If so, this would be self-limiting and likely clear as the drugs clear his system.        Suicidal ideation    Patient voiced suicidal intent to family prior to admit and has been non-cooperative since being here.  Agree with PEC and 1:1 sitter.  Notify  of commitment process. Seek acute inpatient psychiatric facility when medically cleared.  Unknown previous attempts or means.        Generalized anxiety disorder    As per his outpatient psychiatrist.  Seems to be only taking  clonazepam.  Would recommend to avoid controlled substances in this patient with active drug problem.             Stephen Abraham MD   Psychiatry  [facility]

## 2017-06-12 NOTE — ASSESSMENT & PLAN NOTE
Patient voiced suicidal intent to family prior to admit and has been non-cooperative since being here.  Agree with PEC and 1:1 sitter.  Notify  of commitment process. Seek acute inpatient psychiatric facility when medically cleared.  Unknown previous attempts or means.

## 2017-06-12 NOTE — ASSESSMENT & PLAN NOTE
As per his outpatient psychiatrist.  Seems to be only taking clonazepam.  Would recommend to avoid controlled substances in this patient with active drug problem.

## 2017-06-12 NOTE — HPI
"Patient presents to hospital via [facility]  Officers. Patient is in hospital bed, easily aroused to voice, and dressed in hospital gown. Patient is PEC'd for suicidal ideation and belligerent behavior. Patient reports that he does not remember why he is in the hospital. Patient reports that he has been hospitalized multiple times at a variety of hospitals "and another [facility] ain't going to do not [facility]". On review of medical record it indicates that patient was acting bizarre and banging on his mother's door and saying that he was going to "slit my throat". While in ED patient was agitated as well as verbally and physically aggressive to staff. Patient is calm and cooperative on interview. Patient is guarded during interview and gives brief answers to questions with an irritable tone. Patient reports that he has depression but refuses to take medication. Reports that he does see a psychiatrist but doesn't remember her name or where.  Records indicate that he sees Carmen Perry MD at [facility] dept of psychiatry.  Reports that the only medication that he will take in Klonopin. He will not disclose further.  Toxicology positive for THC.  Currently not in restraints but is sedated from IM Haldol/Ativan/Benadryl.  "

## 2017-06-12 NOTE — ASSESSMENT & PLAN NOTE
Patient has chronic cannabis use disorder.  Educated on use and cessation but patient does not want to listen.  Says he is not interested in rehab.  Concern for MOJO or synthetic marijuana use as this has been popular in the area in which he lives.  This may also account for his behavioral changes.  If so, this would be self-limiting and likely clear as the drugs clear his system.

## 2017-06-12 NOTE — ED NOTES
Patient resting in bed, eyes closed. Even unlabored breathing. Side rails x 2up,bed in lowest and locked positon. Sitter 1:1 observation.

## 2017-06-12 NOTE — ED NOTES
Patient resting in bed, side rails x 2 up, bed in lowest and locked position. Patient calm and appropriate when performing EKG. Patient urinated for lab specimen. Patient states he is not hungry and does not want breakfast. Patient given a warm blanket.    Sitter 1:1 observation ([place]).    Will continue to monitor, awaiting placement.

## 2017-06-12 NOTE — ED NOTES
Patient resting in bed, sitter 1:1 observation, even unlabored breathing. Eyes closed.    Awaiting spd transport.

## 2017-06-12 NOTE — ASSESSMENT & PLAN NOTE
Given the cannabis and possible synthetic use, SIMD cannot be ruled out.  Continue to educate on cessation.  Will likely be self limiting.  Utilize Haldol 10mg, Ativan 2mg, Benadryl 50mg PO/IM every 6 hours PRN acute psychotic agitation or non-redirectable behaviors.
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
## 2017-06-12 NOTE — ED NOTES
Patient resting in bed, eyes closed. Even unlabored breathing. Side rails x 2 up, bed in lowest and locked position. Sitter 1:1 observation.

## 2017-06-12 NOTE — SUBJECTIVE & OBJECTIVE
Patient History           Medical as of 6/12/2017     Past Medical History Date Comments Source    Anxiety -- -- Provider    Bipolar disorder -- -- Provider    Borderline personality disorder -- -- Provider    Chronic back pain -- upper and lower Provider    History of psychiatric hospitalization -- -- Provider    Hx of psychiatric care -- currently only taking Klonopin Provider    Knee pain, bilateral -- from old injuries Provider    Psychiatric problem -- -- Provider    Social anxiety disorder -- -- Provider    Therapy -- doesn't remember her name Provider    Pertinent Negatives Date Noted Comments Source    Diabetes mellitus 1/9/2014 -- Provider            Surgical as of 6/12/2017    Past Surgical History: Patient provided no pertinent surgical history.           Family as of 6/12/2017     Problem Relation Name Age of Onset Comments Source    Birth defects Neg Hx -- -- -- Provider    Cancer Neg Hx -- -- -- Provider    Hearing loss Neg Hx -- -- -- Provider    Heart disease Neg Hx -- -- -- Provider    Hypertension Neg Hx -- -- -- Provider            Tobacco Use as of 6/12/2017     Smoking Status Smoking Start Date Smoking Quit Date Packs/day Years Used    Current Every Day Smoker -- -- 0.50 12.00    Types Comments Smokeless Tobacco Status Smokeless Tobacco Quit Date Source     Cigarettes -- Current User -- Provider            Alcohol Use as of 6/12/2017     Alcohol Use Drinks/Week Alcohol/Week Comments Source    Yes -- -- -- Provider            Drug Use as of 6/12/2017     Drug Use Types Frequency Comments Source    Yes  Marijuana -- -- Provider            Sexual Activity as of 6/12/2017     Sexually Active Birth Control Partners Comments Source    Yes -- Female -- Provider            Activities of Daily Living as of 6/12/2017     Activities of Daily Living Question Response Comments Source    Patient feels they ought to cut down on drinking/drug use Not Asked -- Provider    Patient annoyed by others  "criticizing their drinking/drug use Not Asked -- Provider    Patient has felt bad or guilty about drinking/drug use Not Asked -- Provider    Patient has had a drink/used drugs as an eye opener in the AM Not Asked -- Provider            Social Documentation as of 6/12/2017    Mr. Barlow currently lives in [place], [place] with girlfriend and his son.    Education: did not graduated high school, doesn't remember last grade level he completed  Arrests: yes, did not expand  Leisure: "nothing"  Social (Marriage, Living Situation, Children): , has three children.   Financial: "who doesn't"    Psychiatric:  History of Illness: chart indicates that patient has a history of depression, bipolar, and personality traits  Treatment History:        -Psychiatrist: yes, doesn't remember her name       -Psychiatric Medications: klonopin, "refuse to take anything else"  Alcohol Use: none  Drug Use: marijuana, did not expand on how much he is using  SI/HI/AVH: denies    Source: Provider           Occupational as of 6/12/2017     Occupation Employer Comments Source    [employer] -- -- Provider            Socioeconomic as of 6/12/2017     Marital Status Spouse Name Number of Children Years Education Preferred Language Ethnicity Race Source     -- 3 College English /White White Provider         Additional Pertinent History Q A Comments    as of 6/12/2017 Place in Birth Order      Number of Siblings      Raised by adoptive parents     Childhood Trauma early trauma     Financial Status employed     Highest Level of Education unfinished highschool     Lives with family     Lives in home     Criminal History of arrest and incarceration     Legal Involvement      Does patient have access to a firearm? No      Service No     Primary Leisure Activity time with family     Spirituality non-spiritual     Caffeine Use         Review of patient's allergies indicates:   Allergen Reactions    Poison ivy [vit e-nonoxynol " 9-aloe vera]     Poison oak extract     Poison sumac extract        No current facility-administered medications on file prior to encounter.      Current Outpatient Prescriptions on File Prior to Encounter   Medication Sig    clonazePAM (KLONOPIN) 1 MG tablet Take 1 tablet (1 mg total) by mouth 2 (two) times daily as needed for Anxiety.    meloxicam (MOBIC) 7.5 MG tablet Take 1 tablet (7.5 mg total) by mouth 2 (two) times daily as needed for Pain.     Psychotherapeutics     None        Review of Systems   Unable to perform ROS: Psychiatric disorder (will not answer)     Objective:     Vital Signs (Most Recent):  Temp: 98.3 °F (36.8 °C) (06/12/17 1135)  Pulse: 66 (06/12/17 1135)  Resp: 16 (06/12/17 1135)  BP: 103/64 (06/12/17 1135)  SpO2: 98 % (06/12/17 1135) Vital Signs (24h Range):  Temp:  [98.3 °F (36.8 °C)] 98.3 °F (36.8 °C)  Pulse:  [66] 66  Resp:  [16] 16  SpO2:  [98 %] 98 %  BP: (103)/(64) 103/64           There is no height or weight on file to calculate BMI.    No intake or output data in the 24 hours ending 06/12/17 1420    Physical Exam   Psychiatric:   EXAMINATION    CONSTITUTIONAL  General Appearance: hospital attire    MUSCULOSKELETAL  Muscle Strength and Tone: normal  Abnormal Involuntary Movements: none noted  Gait and Station: not observed    PSYCHIATRIC MENTAL STATUS EXAM   Level of Consciousness: sleepy but can be awoken  Orientation: will not answer  Grooming: limited  Psychomotor Behavior: slowed  Speech: soft and somewhat slurred  Language: not able to obtain  Mood: will not answer  Affect: irritable  Thought Process: tangential  Associations: loose  Thought Content: voiced suicidal intent prior to being brought in but will not participate in conversation now  Memory: will not answer  Attention: distracted  Fund of Knowledge: limited  Insight: limited  Judgment: limited/poor              Significant Labs:   Last 24 Hours:   Recent Lab Results       06/12/17  6249 06/12/17  4281  06/11/17  2253      Benzodiazepines  Presumptive Positive      Methadone metabolites  Negative      Phencyclidine  Negative      Acetaminophen (Tylenol), Serum   <3.0  Comment:  Toxic Levels:  Adults (4 hr post-ingestion).........>150 ug/mL  Adults (12 hr post-ingestion)........>40 ug/mL  Peds (2 hr post-ingestion, liquid)...>225 ug/mL  (L)     Albumin   4.4     Alcohol, Medical, Serum   <10     Alkaline Phosphatase   69     ALT   10     Amphetamine Screen, Ur  Negative      Anion Gap   11     Appearance, UA Hazy(A)       AST   23     Bacteria, UA None       Barbiturate Screen, Ur  Negative      Baso #   0.02     Basophil%   0.2     Bilirubin (UA) Negative       Total Bilirubin   0.8  Comment:  For infants and newborns, interpretation of results should be based  on gestational age, weight and in agreement with clinical  observations.  Premature Infant recommended reference ranges:  Up to 24 hours.............<8.0 mg/dL  Up to 48 hours............<12.0 mg/dL  3-5 days..................<15.0 mg/dL  6-29 days.................<15.0 mg/dL       BUN, Bld   19     Calcium   9.7     Chloride   104     CO2   24     Cocaine (Metab.)  Negative      Color, UA Kenzie       Creatinine   1.2     Creatinine, Random Ur  431.1  Comment:  The random urine reference ranges provided were established   for 24 hour urine collections.  No reference ranges exist for  random urine specimens.  Correlate clinically.  (H)      Differential Method   Automated     eGFR if    >60     eGFR if non    >60  Comment:  Calculation used to obtain the estimated glomerular filtration  rate (eGFR) is the CKD-EPI equation. Since race is unknown   in our information system, the eGFR values for   -American and Non--American patients are given   for each creatinine result.       Eos #   0.0     Eosinophil%   0.2     Epithelial Casts, UA CANCELED  Comment:  Result canceled by the ancillary       Glucose   106      Glucose, UA Negative       Gran #   9.6(H)     Gran%   81.9(H)     Hematocrit   43.9     Hemoglobin   15.5     HIV Rapid Testing   Negative     Hyaline Casts, UA 0       Ketones, UA 1+(A)       Leukocytes, UA Negative       Lymph #   1.1     Lymph%   9.6(L)     MCH   30.6     MCHC   35.3     MCV   87     Microscopic Comment SEE COMMENT  Comment:  Other formed elements not mentioned in the report are not   present in the microscopic examination.          Mono #   0.9     Mono%   8.0     MPV   9.2     Nitrite, UA Negative       Occult Blood UA Negative       Opiate Scrn, Ur  Negative      pH, UA 6.0       Platelets   302     Potassium   3.5     Total Protein   7.5     Protein, UA 1+  Comment:  Recommend a 24 hour urine protein or a urine   protein/creatinine ratio if globulin induced proteinuria is  clinically suspected.  (A)       RBC   5.06     RBC, UA 0       RDW   12.5     Sodium   139     Specific Gravity, UA 1.030       Specimen UA Urine, Clean Catch       Marijuana (THC) Metabolite  Presumptive Positive      Toxicology Information  SEE COMMENT  Comment:  This screen includes the following classes of drugs at the   listed cut-off:  Benzodiazepines                  200 ng/ml  Methadone                        300 ng/ml  Cocaine metabolite               300 ng/ml  Opiates                          300 ng/ml  Barbiturates                     200 ng/ml  Amphetamines                    1000 ng/ml  Marijuana metabs (THC)            50 ng/ml  Phencyclidine (PCP)               25 ng/ml  High concentrations of Diphenhydramine may cross-react with  Phencyclidine PCP screening immunoassay giving a false   positive result.  High concentrations of Methylenedioxymethamphetamine (MDMA aka  Ectasy) and other structurally similar compounds may cross-   react with the Amphetamine/Methamphetamine screening   immunoassay giving a false positive result.  A metabolite of the anti-HIV drug Sustiva () may cause  false positive  results in the Marijuana metabolite (THC)   screening assay.  Note: This exception list includes only more common   interferants in toxicology screen testing.  Because of many   cross-reactantspositive results on toxicology drug screens   should be confirmed whenever results do not correlate with   clinical presentation.  This report is intended for use in clinical monitoring and  management of patients. It is not intended for use in   employment related drug testing.  Because of any cross-reactants, positive results on toxicology  drug screens should be confirmed whenever results do not  correlate with clinical presentation.  Presumptive positive results are unconfirmed and may be used   only for medical purposes.        TSH   1.260     Urobilinogen, UA 2.0-3.0(A)       WBC, UA 2       WBC   11.76           Significant Imaging: I have reviewed all pertinent imaging results/findings within the past 24 hours.

## 2017-06-12 NOTE — ED NOTES
Patient in bed with side rails x 2 up, bed in lowest and locked position. Head of bed elevated 30 degrees.  1:1 sitter observation. Will continue to monitor.    Patient asleep.

## 2017-09-22 ENCOUNTER — HOSPITAL ENCOUNTER (EMERGENCY)
Facility: HOSPITAL | Age: 37
Discharge: HOME | End: 2017-09-23
Attending: EMERGENCY MEDICINE
Payer: MEDICARE

## 2017-09-22 DIAGNOSIS — S99.922A FOOT TRAUMA, LEFT, INITIAL ENCOUNTER: ICD-10-CM

## 2017-09-22 DIAGNOSIS — S90.32XA CONTUSION OF LEFT FOOT, INITIAL ENCOUNTER: Primary | ICD-10-CM

## 2017-09-22 PROCEDURE — 99283 EMERGENCY DEPT VISIT LOW MDM: CPT

## 2017-09-23 VITALS
RESPIRATION RATE: 18 BRPM | BODY MASS INDEX: 20.66 KG/M2 | SYSTOLIC BLOOD PRESSURE: 113 MMHG | HEART RATE: 56 BPM | WEIGHT: 124 LBS | DIASTOLIC BLOOD PRESSURE: 66 MMHG | HEIGHT: 65 IN | TEMPERATURE: 99 F | OXYGEN SATURATION: 100 %

## 2017-09-23 RX ORDER — NAPROXEN 500 MG/1
500 TABLET ORAL 2 TIMES DAILY PRN
Qty: 30 TABLET | Refills: 0 | Status: SHIPPED | OUTPATIENT
Start: 2017-09-23 | End: 2019-01-24

## 2017-09-23 NOTE — DISCHARGE INSTRUCTIONS
Follow-up with orthopedic surgery if your symptoms do not improve.    Take pain medications as needed and only as prescribed.    Return to the emergency department for any new or worsening symptoms.

## 2017-09-23 NOTE — ED TRIAGE NOTES
Patient states he was helping his mother clear out her shed and he dropped a 60 lb steel grate on his left foot today. Patient states it hurts to walk on his foot.

## 2017-09-23 NOTE — ED PROVIDER NOTES
"Encounter Date: 9/22/2017    SCRIBE #1 NOTE: I, José Miguel Ybarra, am scribing for, and in the presence of,  Dimas Aguayo NP. I have scribed the following portions of the note - Other sections scribed: HPI, ROS.       History     Chief Complaint   Patient presents with    Foot Pain     pt reports that he dropped a steel grate onto the top on his LEFT foot at work today; pt has small amount of bruising to top of LEFT foot;     CC: Foot Pain    HPI: This 37 y.o. Male with PMHx chronic back pain, bilateral knee pain, bipolar disorder, borderline personality disorder, social anxiety disorder, anxiety, therapy, psychiatric hospitalization, and PSHx cyst removal presents to the ED c/o of severe, acute onset R dorsal foot pain which began x5-6 hours ago. Pt says he tripped over danyel while carrying steel grating and dropped the grating onto his L foot. Pt says "I can't really walk". No prior tx reported. Pt denies taking medications and past medical problems.      The history is provided by the patient. No  was used.     Review of patient's allergies indicates:   Allergen Reactions    Poison ivy [vit e-nonoxynol 9-aloe vera]     Poison oak extract     Poison sumac extract      Past Medical History:   Diagnosis Date    Anxiety     Bipolar disorder     Borderline personality disorder     Chronic back pain     upper and lower    History of psychiatric hospitalization     Hx of psychiatric care     currently only taking Klonopin    Knee pain, bilateral     from old injuries    Psychiatric problem     Social anxiety disorder     Therapy     doesn't remember her name     Past Surgical History:   Procedure Laterality Date    CYST REMOVAL Right     cyst removed from neck lymph node     Family History   Problem Relation Age of Onset    Birth defects Neg Hx     Cancer Neg Hx     Hearing loss Neg Hx     Heart disease Neg Hx     Hypertension Neg Hx      Social History   Substance Use Topics    Smoking " status: Current Every Day Smoker     Packs/day: 0.50     Years: 12.00     Types: Cigarettes    Smokeless tobacco: Current User    Alcohol use Yes     Review of Systems   Constitutional: Negative for fever.   HENT: Negative for sore throat.    Respiratory: Negative for shortness of breath.    Cardiovascular: Negative for chest pain.   Gastrointestinal: Negative for nausea.   Genitourinary: Negative for dysuria.   Musculoskeletal: Negative for arthralgias, back pain and myalgias.        Left foot pain   Skin: Negative for rash.   Neurological: Negative for weakness.   Hematological: Does not bruise/bleed easily.   Psychiatric/Behavioral: The patient is not nervous/anxious.        Physical Exam     Initial Vitals [09/22/17 2250]   BP Pulse Resp Temp SpO2   111/70 65 16 98.6 °F (37 °C) 100 %      MAP       83.67         Physical Exam    Nursing note and vitals reviewed.  Constitutional: He appears well-developed and well-nourished. He is not diaphoretic. No distress.   HENT:   Head: Normocephalic and atraumatic.   Right Ear: External ear normal.   Left Ear: External ear normal.   Nose: Nose normal.   Eyes: Conjunctivae and EOM are normal. Pupils are equal, round, and reactive to light. Right eye exhibits no discharge. Left eye exhibits no discharge. No scleral icterus.   Neck: Normal range of motion. Neck supple. No thyromegaly present. No tracheal deviation present. No JVD present.   Cardiovascular: Normal rate, regular rhythm, normal heart sounds and intact distal pulses. Exam reveals no gallop and no friction rub.    No murmur heard.  Pulmonary/Chest: Breath sounds normal. No stridor. No respiratory distress. He has no wheezes. He has no rhonchi. He has no rales. He exhibits no tenderness.   Abdominal: Soft. Bowel sounds are normal. He exhibits no distension and no mass. There is no rebound and no guarding.   Musculoskeletal: Normal range of motion. He exhibits no edema.        Left foot: There is tenderness and  swelling.   Swelling, ecchymosis, TTP to dorsum of left midfoot   Lymphadenopathy:     He has no cervical adenopathy.   Neurological: He is alert and oriented to person, place, and time. He has normal strength and normal reflexes. He displays normal reflexes. No cranial nerve deficit or sensory deficit.   Skin: Skin is warm and dry. No rash and no abscess noted. No erythema. No pallor.   Psychiatric: He has a normal mood and affect. His behavior is normal. Judgment and thought content normal.         ED Course   Procedures  Labs Reviewed - No data to display          Medical Decision Making:   Differential Diagnosis:   Fracture, dislocation, Lisfranc injury, compartment syndrome  Clinical Tests:   Radiological Study: Ordered and Reviewed  ED Management:  Urgent evaluation of a 37-year-old male presenting for evaluation of left foot pain secondary to dropping a metal grate on top of it earlier today. Active and passive range of motion are without limitation. Patient denies ankle pain. Patient is able to bear weight and walk with antalgic gait. No skin compromise. There is ecchymosis, swelling, and TTP to the dorsum of the left midfoot. Pedal pulses 2+ bilaterally. No neurovascular compromise. No evidence of compartment syndrome. X-ray negative for acute fracture or dislocation or other abnormality. Applied Ace wrap and postop shoe. Instructed patient to follow-up with orthopedic surgery if symptoms do not improve. ED return precautions given. Patient expressed understanding of diagnosis and discharge instructions.  Other:   I have discussed this case with another health care provider.       <> Summary of the Discussion: Case discussed with my attending physician Dr. Mack who agreed with the assessment and plan.            Scribe Attestation:   Scribe #1: I performed the above scribed service and the documentation accurately describes the services I performed. I attest to the accuracy of the note.    Attending  Attestation:           Physician Attestation for Scribe:  Physician Attestation Statement for Scribe #1: I, Dimas Aguayo NP, reviewed documentation, as scribed by José Miguel Ybarra in my presence, and it is both accurate and complete.                 ED Course      Clinical Impression:   The primary encounter diagnosis was Contusion of left foot, initial encounter. A diagnosis of Foot trauma, left, initial encounter was also pertinent to this visit.    Disposition:   Disposition: Discharged  Condition: Stable                        Dimas Aguayo NP  09/23/17 0111

## 2019-03-01 ENCOUNTER — HOSPITAL ENCOUNTER (EMERGENCY)
Facility: HOSPITAL | Age: 39
Discharge: HOME | End: 2019-03-02
Attending: EMERGENCY MEDICINE
Payer: MEDICARE

## 2019-03-01 DIAGNOSIS — E87.6 HYPOKALEMIA: Primary | ICD-10-CM

## 2019-03-01 DIAGNOSIS — R11.2 NON-INTRACTABLE VOMITING WITH NAUSEA, UNSPECIFIED VOMITING TYPE: ICD-10-CM

## 2019-03-01 LAB
ALBUMIN SERPL BCP-MCNC: 4.3 G/DL
ALP SERPL-CCNC: 68 U/L
ALT SERPL W/O P-5'-P-CCNC: 18 U/L
ANION GAP SERPL CALC-SCNC: 11 MMOL/L
AST SERPL-CCNC: 26 U/L
BASOPHILS # BLD AUTO: 0.01 K/UL
BASOPHILS NFR BLD: 0.2 %
BILIRUB SERPL-MCNC: 0.6 MG/DL
BUN SERPL-MCNC: 13 MG/DL
CALCIUM SERPL-MCNC: 9.4 MG/DL
CHLORIDE SERPL-SCNC: 102 MMOL/L
CO2 SERPL-SCNC: 26 MMOL/L
CREAT SERPL-MCNC: 1 MG/DL
DIFFERENTIAL METHOD: ABNORMAL
EOSINOPHIL # BLD AUTO: 0 K/UL
EOSINOPHIL NFR BLD: 0 %
ERYTHROCYTE [DISTWIDTH] IN BLOOD BY AUTOMATED COUNT: 12.7 %
EST. GFR  (AFRICAN AMERICAN): >60 ML/MIN/1.73 M^2
EST. GFR  (NON AFRICAN AMERICAN): >60 ML/MIN/1.73 M^2
GLUCOSE SERPL-MCNC: 109 MG/DL
HCT VFR BLD AUTO: 43.8 %
HGB BLD-MCNC: 14.9 G/DL
LIPASE SERPL-CCNC: 26 U/L
LYMPHOCYTES # BLD AUTO: 1 K/UL
LYMPHOCYTES NFR BLD: 17.5 %
MCH RBC QN AUTO: 29.6 PG
MCHC RBC AUTO-ENTMCNC: 34 G/DL
MCV RBC AUTO: 87 FL
MONOCYTES # BLD AUTO: 1 K/UL
MONOCYTES NFR BLD: 16.2 %
NEUTROPHILS # BLD AUTO: 3.9 K/UL
NEUTROPHILS NFR BLD: 65.9 %
PLATELET # BLD AUTO: 183 K/UL
PMV BLD AUTO: 9.3 FL
POTASSIUM SERPL-SCNC: 3.3 MMOL/L
PROT SERPL-MCNC: 7.1 G/DL
RBC # BLD AUTO: 5.03 M/UL
SODIUM SERPL-SCNC: 139 MMOL/L
WBC # BLD AUTO: 5.88 K/UL

## 2019-03-01 PROCEDURE — 80053 COMPREHEN METABOLIC PANEL: CPT

## 2019-03-01 PROCEDURE — 25000003 PHARM REV CODE 250: Performed by: NURSE PRACTITIONER

## 2019-03-01 PROCEDURE — 96375 TX/PRO/DX INJ NEW DRUG ADDON: CPT

## 2019-03-01 PROCEDURE — 85025 COMPLETE CBC W/AUTO DIFF WBC: CPT

## 2019-03-01 PROCEDURE — 63600175 PHARM REV CODE 636 W HCPCS: Performed by: NURSE PRACTITIONER

## 2019-03-01 PROCEDURE — 96361 HYDRATE IV INFUSION ADD-ON: CPT

## 2019-03-01 PROCEDURE — 99284 EMERGENCY DEPT VISIT MOD MDM: CPT | Mod: 25

## 2019-03-01 PROCEDURE — 25000003 PHARM REV CODE 250: Performed by: PHYSICIAN ASSISTANT

## 2019-03-01 PROCEDURE — 87804 INFLUENZA ASSAY W/OPTIC: CPT

## 2019-03-01 PROCEDURE — 83690 ASSAY OF LIPASE: CPT

## 2019-03-01 RX ORDER — ONDANSETRON 2 MG/ML
4 INJECTION INTRAMUSCULAR; INTRAVENOUS
Status: COMPLETED | OUTPATIENT
Start: 2019-03-01 | End: 2019-03-01

## 2019-03-01 RX ADMIN — SODIUM CHLORIDE 1000 ML: 0.9 INJECTION, SOLUTION INTRAVENOUS at 11:03

## 2019-03-01 RX ADMIN — SODIUM CHLORIDE 1000 ML: 0.9 INJECTION, SOLUTION INTRAVENOUS at 09:03

## 2019-03-01 RX ADMIN — ONDANSETRON 4 MG: 2 INJECTION INTRAMUSCULAR; INTRAVENOUS at 09:03

## 2019-03-02 VITALS
DIASTOLIC BLOOD PRESSURE: 55 MMHG | SYSTOLIC BLOOD PRESSURE: 100 MMHG | WEIGHT: 150 LBS | RESPIRATION RATE: 18 BRPM | TEMPERATURE: 99 F | BODY MASS INDEX: 24.99 KG/M2 | HEIGHT: 65 IN | OXYGEN SATURATION: 96 % | HEART RATE: 66 BPM

## 2019-03-02 LAB
AMPHET+METHAMPHET UR QL: NEGATIVE
BACTERIA #/AREA URNS HPF: ABNORMAL /HPF
BARBITURATES UR QL SCN>200 NG/ML: NEGATIVE
BENZODIAZ UR QL SCN>200 NG/ML: NEGATIVE
BILIRUB UR QL STRIP: NEGATIVE
BZE UR QL SCN: NEGATIVE
CANNABINOIDS UR QL SCN: NORMAL
CLARITY UR: ABNORMAL
COLOR UR: YELLOW
CREAT UR-MCNC: 235.1 MG/DL
CTP QC/QA: YES
FLUAV AG NPH QL: NEGATIVE
FLUBV AG NPH QL: NEGATIVE
GLUCOSE UR QL STRIP: NEGATIVE
GRAN CASTS #/AREA URNS LPF: 2 /LPF
HGB UR QL STRIP: NEGATIVE
HYALINE CASTS #/AREA URNS LPF: 2 /LPF
KETONES UR QL STRIP: ABNORMAL
LEUKOCYTE ESTERASE UR QL STRIP: NEGATIVE
METHADONE UR QL SCN>300 NG/ML: NEGATIVE
MICROSCOPIC COMMENT: ABNORMAL
NITRITE UR QL STRIP: NEGATIVE
OPIATES UR QL SCN: NEGATIVE
PCP UR QL SCN>25 NG/ML: NEGATIVE
PH UR STRIP: 5 [PH] (ref 5–8)
PROT UR QL STRIP: ABNORMAL
RBC #/AREA URNS HPF: 1 /HPF (ref 0–4)
SP GR UR STRIP: 1.02 (ref 1–1.03)
TOXICOLOGY INFORMATION: NORMAL
URN SPEC COLLECT METH UR: ABNORMAL
UROBILINOGEN UR STRIP-ACNC: NEGATIVE EU/DL
WBC #/AREA URNS HPF: 2 /HPF (ref 0–5)

## 2019-03-02 PROCEDURE — 81000 URINALYSIS NONAUTO W/SCOPE: CPT | Mod: 59

## 2019-03-02 PROCEDURE — 96365 THER/PROPH/DIAG IV INF INIT: CPT

## 2019-03-02 PROCEDURE — 25000003 PHARM REV CODE 250: Performed by: PHYSICIAN ASSISTANT

## 2019-03-02 PROCEDURE — 80307 DRUG TEST PRSMV CHEM ANLYZR: CPT

## 2019-03-02 PROCEDURE — 63600175 PHARM REV CODE 636 W HCPCS: Performed by: PHYSICIAN ASSISTANT

## 2019-03-02 RX ORDER — PROMETHAZINE HYDROCHLORIDE 25 MG/1
25 SUPPOSITORY RECTAL EVERY 6 HOURS PRN
Qty: 10 SUPPOSITORY | Refills: 0 | OUTPATIENT
Start: 2019-03-02 | End: 2022-07-20

## 2019-03-02 RX ORDER — ONDANSETRON 4 MG/1
4 TABLET, ORALLY DISINTEGRATING ORAL EVERY 6 HOURS PRN
Qty: 20 TABLET | Refills: 0 | OUTPATIENT
Start: 2019-03-02 | End: 2022-07-20

## 2019-03-02 RX ADMIN — PROMETHAZINE HYDROCHLORIDE 25 MG: 25 INJECTION INTRAMUSCULAR; INTRAVENOUS at 12:03

## 2019-03-02 NOTE — DISCHARGE INSTRUCTIONS
BRAT diet.  Progress diet as tolerated.  Zofran for nausea.  Phenergan for persistent nausea.  Follow-up with primary care physician early next week for re-evaluation.  Return to this ED if any other problems occur.

## 2019-03-02 NOTE — ED PROVIDER NOTES
Encounter Date: 3/1/2019       History     Chief Complaint   Patient presents with    Emesis     x3 days; reports n/v, denies d; denies taking any medication for symptoms; reports back pain from muscle pull after getting out of bed to go to restroom to vomit     38-year-old male chief complaint nausea vomiting x3 days.  He admits to 4-5 episodes of nonbloody, nonbilious emesis per day over the past 3 days.  Patient states on Wednesday he began with chills and body aches.  Denies fever.  No urinary complaints.  No flank pain.  No abdominal pain. Normal BM yesterday.  No neck pain or stiffness. No rash. No trauma.  No recent travel.  No intake of suspect food.  Denies illness prior to nausea vomiting. Denies recent hospitalization, recent antibiotics.  Daughter with what sounds like upper respiratory symptoms at home.  No other sick contacts.  Decreased p.o. intake over the past 2 days secondary to nausea.  No alleviating or exacerbating factors.  No radiation of symptoms.  Symptoms are acute, constant, severity 5/10.    Denies history of any abdominal surgeries.  Denies history of GI bleed, denies NSAID or Tylenol abuse.          Review of patient's allergies indicates:   Allergen Reactions    Poison ivy [vit e-nonoxynol 9-aloe vera]     Poison oak extract     Poison sumac extract      Past Medical History:   Diagnosis Date    Anxiety     Bipolar disorder     Borderline personality disorder     Chronic back pain     upper and lower    History of psychiatric hospitalization     Hx of psychiatric care     currently only taking Klonopin    Knee pain, bilateral     from old injuries    Psychiatric problem     Social anxiety disorder     Therapy     doesn't remember her name     Past Surgical History:   Procedure Laterality Date    CYST REMOVAL Right     cyst removed from neck lymph node    EXCISION, MASS, NECK N/A 3/26/2014    Performed by Pineda Duncan MD at [facility] OR     Family History   Problem  Relation Age of Onset    Birth defects Neg Hx     Cancer Neg Hx     Hearing loss Neg Hx     Heart disease Neg Hx     Hypertension Neg Hx      Social History     Tobacco Use    Smoking status: Current Every Day Smoker     Packs/day: 0.50     Years: 12.00     Pack years: 6.00     Types: Cigarettes    Smokeless tobacco: Current User   Substance Use Topics    Alcohol use: Yes    Drug use: No     Review of Systems   Constitutional: Positive for appetite change (Decreased) and chills. Negative for fever.   HENT: Negative for congestion, ear discharge, ear pain, sore throat and trouble swallowing.    Eyes: Negative for pain and redness.   Respiratory: Negative for cough, chest tightness and shortness of breath.    Cardiovascular: Negative for chest pain.   Gastrointestinal: Positive for nausea and vomiting. Negative for abdominal pain, constipation and diarrhea.   Genitourinary: Negative for dysuria, flank pain, penile pain and testicular pain.   Musculoskeletal: Positive for myalgias. Negative for back pain, neck pain and neck stiffness.   Skin: Negative for rash.   Neurological: Negative for dizziness, weakness, light-headedness and headaches.   Hematological: Does not bruise/bleed easily.   All other systems reviewed and are negative.      Physical Exam     Initial Vitals [03/01/19 2032]   BP Pulse Resp Temp SpO2   116/70 82 15 99.7 °F (37.6 °C) 97 %      MAP       --         Physical Exam    Nursing note and vitals reviewed.  Constitutional: He appears well-developed and well-nourished. He is not diaphoretic. No distress.   Ill appearing, nontoxic.   HENT:   Head: Normocephalic and atraumatic.   Eyes: Conjunctivae and EOM are normal. Pupils are equal, round, and reactive to light.   Neck: Normal range of motion. Neck supple.   Cardiovascular: Intact distal pulses.   Pulmonary/Chest: Breath sounds normal. No respiratory distress. He has no wheezes.   Abdominal:   Abdomen overall soft, normal bowel sounds ×4.   No significant tenderness to palpation of any quadrant.  No rebound or guarding.  No palpable mass or distention.  No flank or CVA tenderness.  Negative Knapp sign.  No pain over McBurney's point.   Musculoskeletal: Normal range of motion. He exhibits no tenderness.   Neurological: He is alert and oriented to person, place, and time. He has normal strength.   Skin: Skin is warm and dry. Capillary refill takes less than 2 seconds. No rash and no abscess noted. No erythema.   Psychiatric: He has a normal mood and affect. His behavior is normal. Judgment and thought content normal.         ED Course   Procedures  Labs Reviewed   CBC W/ AUTO DIFFERENTIAL - Abnormal; Notable for the following components:       Result Value    Lymph% 17.5 (*)     Mono% 16.2 (*)     All other components within normal limits   COMPREHENSIVE METABOLIC PANEL - Abnormal; Notable for the following components:    Potassium 3.3 (*)     All other components within normal limits   URINALYSIS, REFLEX TO URINE CULTURE - Abnormal; Notable for the following components:    Appearance, UA Cloudy (*)     Protein, UA 1+ (*)     Ketones, UA 1+ (*)     All other components within normal limits    Narrative:     Preferred Collection Type->Urine, Clean Catch   URINALYSIS MICROSCOPIC - Abnormal; Notable for the following components:    Bacteria, UA Moderate (*)     Hyaline Casts, UA 2 (*)     Granular Casts, UA 2 (*)     All other components within normal limits    Narrative:     Preferred Collection Type->Urine, Clean Catch   LIPASE   DRUG SCREEN PANEL, URINE EMERGENCY    Narrative:     Preferred Collection Type->Urine, Clean Catch   POCT INFLUENZA A/B          Imaging Results    None          Medical Decision Making:   Differential Diagnosis:   Viral illness, gastroenteritis, viral gastroenteritis, gastritis, GERD, perforated viscus, constipation, colitis, appendicitis  ED Management:  Presentation suspicious for a viral illness.  No abdominal pain or  tenderness. Basic labs, urine, fluids, Zofran, will re-evaluate.    Nausea improved with phenergan and fluids. Urine with bacteria without obvious infection?--culture pending. Evidence of dehydration. Labs otherwise unremarkable. Tolerating PO. No episodes of emesis in ED. Will d/c with supportive meds. Likely viral illness. BRAT diet, lots of fluids, PCP f/u. Return precautions given.     Denies rectal pain, denies painful BMs. Afebrile. Unlikely prostatitis. I've asked him to attempt home treatment, but to immediately return to ED if he begins with fever, if n/v persists or worsens, if he begins with abdominal pain. He does understand and agree. Unlikely sepsis. qsofa score 0.                       Clinical Impression:       ICD-10-CM ICD-9-CM   1. Hypokalemia E87.6 276.8   2. Non-intractable vomiting with nausea, unspecified vomiting type R11.2 787.01         Disposition:   Disposition: Discharged  Condition: Stable         Vincent García PA-C  03/02/19 0641

## 2021-03-05 ENCOUNTER — HOSPITAL ENCOUNTER (EMERGENCY)
Facility: HOSPITAL | Age: 41
Discharge: HOME | End: 2021-03-05
Attending: EMERGENCY MEDICINE
Payer: MEDICARE

## 2021-03-05 VITALS
SYSTOLIC BLOOD PRESSURE: 117 MMHG | WEIGHT: 130 LBS | HEART RATE: 78 BPM | OXYGEN SATURATION: 97 % | TEMPERATURE: 99 F | DIASTOLIC BLOOD PRESSURE: 79 MMHG | RESPIRATION RATE: 17 BRPM | BODY MASS INDEX: 21.63 KG/M2

## 2021-03-05 DIAGNOSIS — Z91.89 AT INCREASED RISK OF EXPOSURE TO COVID-19 VIRUS: Primary | ICD-10-CM

## 2021-03-05 PROCEDURE — U0003 INFECTIOUS AGENT DETECTION BY NUCLEIC ACID (DNA OR RNA); SEVERE ACUTE RESPIRATORY SYNDROME CORONAVIRUS 2 (SARS-COV-2) (CORONAVIRUS DISEASE [COVID-19]), AMPLIFIED PROBE TECHNIQUE, MAKING USE OF HIGH THROUGHPUT TECHNOLOGIES AS DESCRIBED BY CMS-2020-01-R: HCPCS | Performed by: EMERGENCY MEDICINE

## 2021-03-05 PROCEDURE — 99284 EMERGENCY DEPT VISIT MOD MDM: CPT | Mod: 25,ER

## 2021-03-05 PROCEDURE — U0005 INFEC AGEN DETEC AMPLI PROBE: HCPCS | Performed by: EMERGENCY MEDICINE

## 2021-03-05 RX ORDER — MELOXICAM 15 MG/1
15 TABLET ORAL DAILY
Qty: 30 TABLET | Refills: 0 | OUTPATIENT
Start: 2021-03-05 | End: 2022-07-20

## 2021-03-05 RX ORDER — FAMOTIDINE 20 MG/1
20 TABLET, FILM COATED ORAL 2 TIMES DAILY
Qty: 60 TABLET | Refills: 0 | OUTPATIENT
Start: 2021-03-05 | End: 2022-07-20

## 2021-03-05 RX ORDER — CETIRIZINE HYDROCHLORIDE 10 MG/1
10 TABLET ORAL DAILY
Qty: 30 TABLET | Refills: 0 | OUTPATIENT
Start: 2021-03-05 | End: 2022-07-20

## 2021-03-05 RX ORDER — GUAIFENESIN 100 MG/5ML
100-200 SOLUTION ORAL EVERY 4 HOURS PRN
Qty: 60 ML | Refills: 0 | Status: SHIPPED | OUTPATIENT
Start: 2021-03-05 | End: 2021-03-15

## 2021-03-05 RX ORDER — ONDANSETRON 8 MG/1
8 TABLET, ORALLY DISINTEGRATING ORAL EVERY 6 HOURS PRN
Qty: 30 TABLET | Refills: 0 | OUTPATIENT
Start: 2021-03-05 | End: 2022-07-20

## 2021-03-07 DIAGNOSIS — U07.1 COVID-19 VIRUS DETECTED: ICD-10-CM

## 2021-03-07 LAB — SARS-COV-2 RNA RESP QL NAA+PROBE: DETECTED

## 2022-07-20 ENCOUNTER — HOSPITAL ENCOUNTER (EMERGENCY)
Facility: HOSPITAL | Age: 42
Discharge: HOME | End: 2022-07-20
Attending: INTERNAL MEDICINE
Payer: MEDICARE

## 2022-07-20 VITALS
SYSTOLIC BLOOD PRESSURE: 141 MMHG | TEMPERATURE: 98 F | HEIGHT: 65 IN | HEART RATE: 84 BPM | BODY MASS INDEX: 21.16 KG/M2 | DIASTOLIC BLOOD PRESSURE: 77 MMHG | WEIGHT: 127 LBS | RESPIRATION RATE: 18 BRPM | OXYGEN SATURATION: 98 %

## 2022-07-20 DIAGNOSIS — S60.221A CONTUSION OF RIGHT HAND, INITIAL ENCOUNTER: Primary | ICD-10-CM

## 2022-07-20 PROCEDURE — 99284 EMERGENCY DEPT VISIT MOD MDM: CPT | Mod: 25,ER

## 2022-07-20 PROCEDURE — 29125 APPL SHORT ARM SPLINT STATIC: CPT | Mod: RT,ER

## 2022-07-20 PROCEDURE — 25000003 PHARM REV CODE 250: Mod: ER | Performed by: NURSE PRACTITIONER

## 2022-07-20 RX ORDER — IBUPROFEN 600 MG/1
600 TABLET ORAL EVERY 6 HOURS PRN
Qty: 20 TABLET | Refills: 0 | Status: SHIPPED | OUTPATIENT
Start: 2022-07-20 | End: 2022-07-25

## 2022-07-20 RX ORDER — IBUPROFEN 600 MG/1
600 TABLET ORAL
Status: COMPLETED | OUTPATIENT
Start: 2022-07-20 | End: 2022-07-20

## 2022-07-20 RX ORDER — IBUPROFEN 400 MG/1
400 TABLET ORAL
Status: DISCONTINUED | OUTPATIENT
Start: 2022-07-20 | End: 2022-07-20

## 2022-07-20 RX ORDER — DEXTROMETHORPHAN HYDROBROMIDE, GUAIFENESIN 5; 100 MG/5ML; MG/5ML
650 LIQUID ORAL EVERY 8 HOURS
Qty: 20 TABLET | Refills: 0 | Status: SHIPPED | OUTPATIENT
Start: 2022-07-20 | End: 2022-07-25

## 2022-07-20 RX ORDER — ESTRADIOL 2 MG/1
6 TABLET ORAL DAILY
COMMUNITY
Start: 2022-07-05

## 2022-07-20 RX ORDER — METHOCARBAMOL 500 MG/1
1000 TABLET, FILM COATED ORAL 3 TIMES DAILY
Qty: 30 TABLET | Refills: 0 | Status: SHIPPED | OUTPATIENT
Start: 2022-07-20 | End: 2022-07-25

## 2022-07-20 RX ORDER — TRIPROLIDINE/PSEUDOEPHEDRINE 2.5MG-60MG
600 TABLET ORAL
Status: DISCONTINUED | OUTPATIENT
Start: 2022-07-20 | End: 2022-07-20

## 2022-07-20 RX ORDER — SPIRONOLACTONE 100 MG/1
100 TABLET, FILM COATED ORAL DAILY
COMMUNITY
Start: 2022-07-05

## 2022-07-20 RX ADMIN — IBUPROFEN 600 MG: 600 TABLET ORAL at 08:07

## 2022-07-20 NOTE — FIRST PROVIDER EVALUATION
Emergency Department TeleTriage Encounter Note      CHIEF COMPLAINT    Chief Complaint   Patient presents with    Hand Injury     Patient presents to ED c/o hand injury, pt reports R Hand injury 1 hr pta, states hand was closed in ladder   Patient able to move fingers        VITAL SIGNS   Initial Vitals [07/20/22 1740]   BP Pulse Resp Temp SpO2   103/75 75 17 98.2 °F (36.8 °C) 99 %      MAP       --            ALLERGIES    Review of patient's allergies indicates:   Allergen Reactions    Poison ivy [vit e-nonoxynol 9-aloe vera]     Poison oak extract     Poison sumac extract        PROVIDER TRIAGE NOTE  Patient presents to the ER due to right hand pain since an injury 1-1/2 hours ago.  He accidentally closed his hand in a ladder.  Will order motrin and xray of hand pending ED provider evaluation.     Initial orders will be placed and care will be transferred to an alternate provider when patient is roomed for a full evaluation. Any additional orders and the final disposition will be determined by that provider.         ORDERS  Labs Reviewed - No data to display    ED Orders (720h ago, onward)    None            Virtual Visit Note: The provider triage portion of this emergency department evaluation and documentation was performed via [employer] a HIPAA-compliant telemedicine application, in concert with a tele-presenter in the room. A face to face patient evaluation with one of my colleagues will occur once the patient is placed in an emergency department room.      DISCLAIMER: This note was prepared with [employer] voice recognition transcription software. Garbled syntax, mangled pronouns, and other bizarre constructions may be attributed to that software system.

## 2022-07-20 NOTE — Clinical Note
"Dmitriy Lam"Cain was seen and treated in our emergency department on 7/20/2022.  He may return to work on 07/22/2022.       If you have any questions or concerns, please don't hesitate to call.      MARTIN Ritter"

## 2022-07-21 NOTE — ED PROVIDER NOTES
Encounter Date: 7/20/2022    SCRIBE #1 NOTE: I, Mariel Khan, am scribing for, and in the presence of,  MARTIN Ritter. I have scribed the following portions of the note - Other sections scribed: HPI, ROS, PE.       History     Chief Complaint   Patient presents with    Hand Injury     Patient presents to ED c/o hand injury, pt reports R Hand injury 1 hr pta, states hand was closed in ladder   Patient able to move fingers      41 year old male with anxiety and bipolar disorder presents to the ED with complaints of right hand pain beginning today. Pt states they slammed their hand in a ladder 1 hour PTA. Pt reports no associated symptoms. Patient denies rash, fever, chest pain, SOB, numbness, weakness, tingling, abdominal pain, back pain, dysuria, hematuria, nausea, vomiting, diarrhea, or any other complaints. Pt rates his pain as 8/10 ans has not taken any medications for the symptoms. No alleviating/aggravating factors. Pt takes Spironolactone and Estradiol as they are transgender and transitioning to female. Does not endorse cigarette, alcohol or drug use. Allergic to Poison Ivy, Poison Oak and Poison Sumac.         The history is provided by the patient. No  was used.     Review of patient's allergies indicates:   Allergen Reactions    Poison ivy [vit e-nonoxynol 9-aloe vera]     Poison oak extract     Poison sumac extract      Past Medical History:   Diagnosis Date    Anxiety     Bipolar disorder     Borderline personality disorder     Chronic back pain     upper and lower    History of psychiatric hospitalization     Hx of psychiatric care     currently only taking Klonopin    Knee pain, bilateral     from old injuries    Psychiatric problem     Social anxiety disorder     Therapy     doesn't remember her name     Past Surgical History:   Procedure Laterality Date    CYST REMOVAL Right     cyst removed from neck lymph node     Family History   Problem Relation Age of  Onset    Birth defects Neg Hx     Cancer Neg Hx     Hearing loss Neg Hx     Heart disease Neg Hx     Hypertension Neg Hx      Social History     Tobacco Use    Smoking status: Current Every Day Smoker     Packs/day: 0.50     Years: 12.00     Pack years: 6.00     Types: Cigarettes    Smokeless tobacco: Current User   Substance Use Topics    Alcohol use: Yes    Drug use: No     Review of Systems   Constitutional: Negative for chills and fever.   HENT: Negative for congestion, ear pain, rhinorrhea and sore throat.    Eyes: Negative for pain, discharge and redness.   Respiratory: Negative for cough and shortness of breath.    Cardiovascular: Negative for chest pain.   Gastrointestinal: Negative for abdominal pain, diarrhea, nausea and vomiting.   Genitourinary: Negative for dysuria.   Musculoskeletal: Positive for myalgias (Left hand). Negative for back pain, neck pain and neck stiffness.   Skin: Negative for rash.   Neurological: Negative for dizziness, weakness, light-headedness, numbness and headaches.   Psychiatric/Behavioral: Negative for confusion.       Physical Exam     Initial Vitals [07/20/22 1740]   BP Pulse Resp Temp SpO2   103/75 75 17 98.2 °F (36.8 °C) 99 %      MAP       --         Physical Exam    Nursing note and vitals reviewed.  Constitutional: Vital signs are normal. He appears well-developed and well-nourished. He is cooperative.  Non-toxic appearance. He does not appear ill.   HENT:   Head: Normocephalic and atraumatic.   Right Ear: External ear normal.   Left Ear: External ear normal.   Nose: Nose normal.   Mouth/Throat: Oropharynx is clear and moist.   Eyes: Conjunctivae and EOM are normal. Pupils are equal, round, and reactive to light.   Neck: Neck supple.   Normal range of motion.  Cardiovascular: Normal rate and regular rhythm.   Pulses:       Radial pulses are 2+ on the right side.   Pulmonary/Chest: Effort normal and breath sounds normal.   Musculoskeletal:         General: Normal  range of motion.      Right hand: Tenderness present. No swelling. Normal range of motion. Normal strength. Normal sensation.      Cervical back: Normal range of motion and neck supple.      Comments: Tenderness to right hand. No swelling, erythema rash or bruising. Skin intact. Normal strength, sensation and ROM. +2 RP. No snuff box tenderness.      Neurological: He is alert and oriented to person, place, and time. No cranial nerve deficit. GCS eye subscore is 4. GCS verbal subscore is 5. GCS motor subscore is 6.   Skin: Skin is warm, dry and intact. No bruising and no rash noted. No erythema.   Psychiatric: He has a normal mood and affect. His speech is normal and behavior is normal. Thought content normal.         ED Course   Orthopedic Injury    Date/Time: 7/20/2022 9:28 PM  Performed by: MARTIN Ritter  Authorized by: Tom Rousseau MD     Location procedure was performed:  [facility] EMERGENCY DEPARTMENT  Injury:     Injury location:  Hand    Location details:  Right hand    Injury type:  Soft tissue      Pre-procedure assessment:     Neurovascular status: Neurovascularly intact      Distal perfusion: normal      Neurological function: normal      Range of motion: normal        Selections made in this section will also lock the Injury type section above.:     Immobilization:  Brace (velcro splint)    Complications: No      Estimated blood loss (mL):  0    Specimens: No      Implants: No    Post-procedure assessment:     Neurovascular status: Neurovascularly intact      Distal perfusion: normal      Neurological function: normal      Range of motion: splinted      Patient tolerance:  Patient tolerated the procedure well with no immediate complications      Labs Reviewed - No data to display       Imaging Results          X-Ray Hand 3 view Right (Final result)  Result time 07/20/22 19:44:41    Final result by Christiane Suresh MD (07/20/22 19:44:41)                 Impression:      No acute bony abnormality  detected.      Electronically signed by: Christiane Suresh  Date:    07/20/2022  Time:    19:44             Narrative:    EXAMINATION:  THREE VIEWS OF THE RIGHT HAND    CLINICAL HISTORY:  right hand injury;    TECHNIQUE:  AP, lateral, and oblique views of the right hand    COMPARISON:  01/09/2014    FINDINGS:  Three views of the right hand demonstrate no acute fracture or dislocation.  There is a remote boxer's fracture of the 5th metacarpal.                                 Medications   ibuprofen tablet 600 mg (600 mg Oral Given 7/20/22 2052)     Medical Decision Making:   Independently Interpreted Test(s):   I have ordered and independently interpreted X-rays - see prior notes.  Clinical Tests:   Radiological Study: Reviewed and Ordered       APC / Resident Notes:   This is an evaluation of a 41 y.o. male that presents to the Emergency Department for right hand injury    Physical Exam shows a non-toxic, afebrile, and well appearing male. Tenderness to right hand. No swelling, erythema rash or bruising. Skin intact. Normal strength, sensation and ROM. +2 RP. No snuff box tenderness.     Vital signs are reassuring. If available, previous records reviewed.   RESULTS: Xray showed an old boxer fracture, no acute fracture or dislocation    My overall impression is Right hand injury/contusion. I considered, but at this time, do not suspect fracture, dislocation, cellulitis, septic joint.    ED Course: Xray, Ibuprofen, Velcro splint for comfort. Discharge Meds/Instructions: tylenol, Ibuprofen, Robaxin. The diagnosis, treatment plan, instructions for follow-up as well as ED return precautions were discussed. All questions have been answered.          Scribe Attestation:   Scribe #1: I performed the above scribed service and the documentation accurately describes the services I performed. I attest to the accuracy of the note.               I, SANNA Sun, personally performed the services described in this  documentation.  All medical record entries made by the scribe were at my direction and in my presence.  I have reviewed the chart and agree that the record reflects my personal performance and is accurate and complete.  Clinical Impression:   Final diagnoses:  [S60.221A] Contusion of right hand, initial encounter (Primary)          ED Disposition Condition    Discharge Stable        ED Prescriptions     Medication Sig Dispense Start Date End Date Auth. Provider    acetaminophen (TYLENOL) 650 MG TbSR Take 1 tablet (650 mg total) by mouth every 8 (eight) hours. for 5 days 20 tablet 7/20/2022 7/25/2022 MARTIN Ritter    ibuprofen (ADVIL,MOTRIN) 600 MG tablet Take 1 tablet (600 mg total) by mouth every 6 (six) hours as needed for Pain. 20 tablet 7/20/2022 7/25/2022 MARTIN Ritter    methocarbamoL (ROBAXIN) 500 MG Tab Take 2 tablets (1,000 mg total) by mouth 3 (three) times daily. for 5 days 30 tablet 7/20/2022 7/25/2022 MARTIN Ritter        Follow-up Information     Follow up With Specialties Details Why Contact Info    Juwan Jordan MD Orthopedic Surgery Schedule an appointment as soon as possible for a visit in 2 days  [address]  [address]  [place] [address]  [phone]      [facility] ED Emergency Medicine Go to  If symptoms worsen [address] [address] [phone]           MARTIN Ritter  07/20/22 2120

## 2025-03-13 ENCOUNTER — HOSPITAL ENCOUNTER (EMERGENCY)
Facility: HOSPITAL | Age: 45
Discharge: HOME | End: 2025-03-14
Attending: EMERGENCY MEDICINE
Payer: MEDICARE

## 2025-03-13 VITALS
BODY MASS INDEX: 22.99 KG/M2 | WEIGHT: 138 LBS | OXYGEN SATURATION: 98 % | DIASTOLIC BLOOD PRESSURE: 76 MMHG | RESPIRATION RATE: 20 BRPM | SYSTOLIC BLOOD PRESSURE: 127 MMHG | HEART RATE: 85 BPM | TEMPERATURE: 99 F | HEIGHT: 65 IN

## 2025-03-13 DIAGNOSIS — R51.9 FRONTAL HEADACHE: Primary | ICD-10-CM

## 2025-03-13 DIAGNOSIS — R51.9 FACIAL PAIN: ICD-10-CM

## 2025-03-13 PROCEDURE — 99284 EMERGENCY DEPT VISIT MOD MDM: CPT

## 2025-03-13 RX ORDER — DIPHENHYDRAMINE HCL 25 MG
25 CAPSULE ORAL
Status: COMPLETED | OUTPATIENT
Start: 2025-03-14 | End: 2025-03-14

## 2025-03-13 RX ORDER — PROCHLORPERAZINE MALEATE 5 MG
10 TABLET ORAL
Status: COMPLETED | OUTPATIENT
Start: 2025-03-14 | End: 2025-03-14

## 2025-03-13 RX ORDER — KETOROLAC TROMETHAMINE 10 MG/1
10 TABLET, FILM COATED ORAL
Status: COMPLETED | OUTPATIENT
Start: 2025-03-14 | End: 2025-03-14

## 2025-03-14 PROCEDURE — 25000003 PHARM REV CODE 250

## 2025-03-14 PROCEDURE — 63600175 PHARM REV CODE 636 W HCPCS

## 2025-03-14 RX ORDER — NAPROXEN 500 MG/1
500 TABLET ORAL 2 TIMES DAILY
Qty: 60 TABLET | Refills: 0 | Status: SHIPPED | OUTPATIENT
Start: 2025-03-14

## 2025-03-14 RX ORDER — AMOXICILLIN AND CLAVULANATE POTASSIUM 875; 125 MG/1; MG/1
1 TABLET, FILM COATED ORAL 2 TIMES DAILY
Qty: 14 TABLET | Refills: 0 | Status: SHIPPED | OUTPATIENT
Start: 2025-03-14

## 2025-03-14 RX ADMIN — PROCHLORPERAZINE MALEATE 10 MG: 5 TABLET ORAL at 12:03

## 2025-03-14 RX ADMIN — DIPHENHYDRAMINE HYDROCHLORIDE 25 MG: 25 CAPSULE ORAL at 12:03

## 2025-03-14 RX ADMIN — KETOROLAC TROMETHAMINE 10 MG: 10 TABLET, FILM COATED ORAL at 12:03

## 2025-03-14 NOTE — ED NOTES
Pt presents to the ED with a complaint of left jaw and eye pain.  The pt stated that the pain initially started in the jaw and now has move to the eye.  Pt denies any trauma

## 2025-03-14 NOTE — DISCHARGE INSTRUCTIONS
Mr. Barlow,    Thank you for letting me care for you today! It was nice meeting you, and I hope you feel better soon.   If you would like access to your chart and what was done today please utilize the [facility].   Please don't hesitate to return if your symptoms worsen or you develop any other worrisome symptoms.    Our goal in the emergency department is to always give you outstanding care and exceptional service. You may receive a survey by mail or e-mail in the next week regarding your experience in our ED. We would greatly appreciate you completing and returning the survey. Your feedback provides us with a way to recognize our staff who give very good care and it helps us learn how to improve when your experience was below our aspiration of excellence.     Sincerely,    Caridad Frausto PA-C  Emergency Department Physician Assistant  [facility], [place], and [facility]